# Patient Record
Sex: FEMALE | Race: WHITE | Employment: UNEMPLOYED | URBAN - METROPOLITAN AREA
[De-identification: names, ages, dates, MRNs, and addresses within clinical notes are randomized per-mention and may not be internally consistent; named-entity substitution may affect disease eponyms.]

---

## 2023-01-24 ENCOUNTER — TELEPHONE (OUTPATIENT)
Dept: UROLOGY | Facility: CLINIC | Age: 33
End: 2023-01-24
Payer: COMMERCIAL

## 2023-01-24 NOTE — TELEPHONE ENCOUNTER
[facility] Call Center    Phone Message    May a detailed message be left on voicemail: yes     Reason for Call: Other: Pt is looking for a Vaginoplasty consult with Dr. Espinosa, please call pt to further discuss, thanks!     Action Taken: Message routed to:  Other: Uro    Travel Screening: Not Applicable

## 2023-01-24 NOTE — CONFIDENTIAL NOTE
Writer JONH re: request for consult with the [facility] [place]. Mychart not active.

## 2023-01-25 ENCOUNTER — TELEPHONE (OUTPATIENT)
Dept: PLASTIC SURGERY | Facility: CLINIC | Age: 33
End: 2023-01-25
Payer: COMMERCIAL

## 2023-01-25 DIAGNOSIS — F64.0 GENDER DYSPHORIA IN ADULT: Primary | ICD-10-CM

## 2023-01-25 NOTE — CONFIDENTIAL NOTE
[facility] :  Care Coordination Note     SITUATION   Ria Leija (she/her) is a 32 year old adult who is receiving support for:  Care Team  .    BACKGROUND     Pt is scheduled for a full depth vaginoplasty consult with Dr. Espinosa on 12/12/23.     Ria was going through the process with a different surgeon, but it fell through. Pt just finished hair removal.     ASSESSMENT     Surgery              C[facility] Assessment  [facility] ([facility]) Enrollment: Enrolled  Patient has a therapist: Yes  Letter of support #1: Requested  Letter of support #2: Requested  Surgery being considered: Yes  Vaginoplasty: Yes    Pt reports:   No nicotine  HRT 7 years  No other gender affirming surgeries      PLAN          Nursing Interventions:       Follow-up plan:  1. Obtain ARSENIO Garcia

## 2023-03-24 ENCOUNTER — TELEPHONE (OUTPATIENT)
Dept: PLASTIC SURGERY | Facility: CLINIC | Age: 33
End: 2023-03-24
Payer: COMMERCIAL

## 2023-03-24 NOTE — CONFIDENTIAL NOTE
Writer called re: options for earlier vaginoplasty consult. Pt is done with hair removal so would be best to get bumped up on Dr. Espinosa's schedule. Could not leave voicemail since voicemail is full. Mychart not active.

## 2023-04-03 ENCOUNTER — TELEPHONE (OUTPATIENT)
Dept: PLASTIC SURGERY | Facility: CLINIC | Age: 33
End: 2023-04-03
Payer: COMMERCIAL

## 2023-04-03 NOTE — CONFIDENTIAL NOTE
Writer called to offer earlier vaginoplasty consult with Dr. Espinosa. Pt reported completing hair removal so would be best to schedule earlier with Dr. Espinosa, rather than Misti Bartholomew. Couldn't LVM because voicemail is full.

## 2023-06-26 ENCOUNTER — TELEPHONE (OUTPATIENT)
Dept: PLASTIC SURGERY | Facility: CLINIC | Age: 33
End: 2023-06-26
Payer: COMMERCIAL

## 2023-06-26 NOTE — CONFIDENTIAL NOTE
Writer reached out to offer earlier appointment available with Dr. Espinosa. Pt reports being done with hair removal. Writer rescheduled her for 9/12/23.

## 2023-07-06 NOTE — TELEPHONE ENCOUNTER
FUTURE VISIT INFORMATION      FUTURE VISIT INFORMATION:    Date: 9/12/23    Time: 2:00pm    Location: [facility] [place]  REFERRAL INFORMATION:    Reason for visit/diagnosis  full depth vaginoplasty    RECORDS REQUESTED FROM:       No recs to collect

## 2023-09-12 ENCOUNTER — TELEPHONE (OUTPATIENT)
Dept: PLASTIC SURGERY | Facility: CLINIC | Age: 33
End: 2023-09-12

## 2023-09-12 ENCOUNTER — OFFICE VISIT (OUTPATIENT)
Dept: PLASTIC SURGERY | Facility: CLINIC | Age: 33
End: 2023-09-12
Payer: COMMERCIAL

## 2023-09-12 ENCOUNTER — PRE VISIT (OUTPATIENT)
Dept: UROLOGY | Facility: CLINIC | Age: 33
End: 2023-09-12

## 2023-09-12 VITALS
OXYGEN SATURATION: 97 % | WEIGHT: 293 LBS | BODY MASS INDEX: 45.99 KG/M2 | HEART RATE: 98 BPM | DIASTOLIC BLOOD PRESSURE: 85 MMHG | HEIGHT: 67 IN | SYSTOLIC BLOOD PRESSURE: 121 MMHG

## 2023-09-12 DIAGNOSIS — F64.0 GENDER DYSPHORIA IN ADULT: Primary | ICD-10-CM

## 2023-09-12 PROBLEM — F41.1 GENERALIZED ANXIETY DISORDER: Status: ACTIVE | Noted: 2017-01-30

## 2023-09-12 PROBLEM — F32.1 MODERATE MAJOR DEPRESSION, SINGLE EPISODE (H): Status: ACTIVE | Noted: 2017-04-21

## 2023-09-12 PROCEDURE — 99205 OFFICE O/P NEW HI 60 MIN: CPT | Performed by: NURSE PRACTITIONER

## 2023-09-12 RX ORDER — MEDROXYPROGESTERONE ACETATE 10 MG
10 TABLET ORAL EVERY MORNING
COMMUNITY
Start: 2022-07-18

## 2023-09-12 RX ORDER — ESCITALOPRAM OXALATE 20 MG/1
1.5 TABLET ORAL EVERY MORNING
COMMUNITY
Start: 2022-07-18

## 2023-09-12 RX ORDER — HYDROXYZINE HYDROCHLORIDE 50 MG/1
100 TABLET, FILM COATED ORAL EVERY 6 HOURS PRN
COMMUNITY
Start: 2023-06-16

## 2023-09-12 RX ORDER — SPIRONOLACTONE 100 MG/1
250 TABLET, FILM COATED ORAL EVERY MORNING
Status: ON HOLD | COMMUNITY
Start: 2022-07-18 | End: 2024-09-30

## 2023-09-12 RX ORDER — ESTRADIOL 2 MG/1
2 TABLET ORAL 2 TIMES DAILY
COMMUNITY
Start: 2022-07-20

## 2023-09-12 ASSESSMENT — PAIN SCALES - GENERAL: PAINLEVEL: NO PAIN (0)

## 2023-09-12 NOTE — LETTER
9/12/2023       RE: Ria Leija  [address]  [address]  [place] [address]     Dear Colleague,    Thank you for referring your patient, Ria Leija, to the [facility] [place] [facility]. Please see a copy of my visit note below.        Name: Ria Leija     MRN: [id number]   YOB: 1990                 Chief Complaint:   Gender Dysphoria            History of Present Illness:   Ria is a 33 year old transgender female seen in consultation for gender dysphoria    Patient transitioned starting approximately 8 years ago  Preferred pronouns are: she/her/hers  The patient has been on exogenous hormones since: Feb 2016 (spironolactone, estradiol, progesterone).  In terms of an intimate relationship, the patient is . Here with her wife, Hansa.  In terms of fertility, the patient: had done sperm banking but her sample was destroyed and she is not planning to do this again. They are considering donor sperm or adoption.    (New)  The patient has obtained letters of support from two mental health providers. They have not yet been reviewed, but she brought them to clinic today    (Prior Surgery)  The patient has previously undergone no gender surgeries.     Long-term surgical goals for the patient include: full depth vaginoplasty    The patient is here today expressing interest in full depth vaginoplasty.    The patient has done hair removal. Finished hair removal over 6 months ago.     Patient had a consult with another gender program but was told there was no flexibility to their BMI cut-off. She was devastated and is hoping our program will work with her to find a healthy compromise. She is very active and reports having a high muscle mass. She states she does not feel like BMI of 35 is realistic for her and she would not be healthy if she tried. She reports increasing muscle mass when  "working on weight loss which keeps her BMI higher.  She reports a healthy weight for her is weight goal between 290-300lbs. She will work on weight loss and exercise while waiting to see Dr Espinosa.          Past Medical History:   No past medical history on file.  Anxiety  Depression  OCD  Dysplexia  Dysgraphia  Autonomic aphororesis  ADHD  Sensory processing d/o  Asthma - well controlled         Past Surgical History:   No past surgical history on file.  Eustachian tubes as a child         Social History:     Social History     Tobacco Use    Smoking status: Never    Smokeless tobacco: Never   Substance Use Topics    Alcohol use: Not on file            Family History:   No family history on file.           Allergies:     Allergies   Allergen Reactions    Amoxicillin Other (See Comments)     Other Reaction(s): Unknown    States that it has never cleared anything up.    Codeine Other (See Comments)     hallucinations            Medications:     Current Outpatient Medications   Medication Sig    amitriptyline (ELAVIL) 25 MG tablet Take 1 tablet by mouth every evening    escitalopram (LEXAPRO) 20 MG tablet Take 1.5 tablets by mouth daily    estradiol (ESTRACE) 2 MG tablet Take 2 tablets by mouth 2 times daily    hydrOXYzine (ATARAX) 50 MG tablet Take 100 mg by mouth    medroxyPROGESTERone (PROVERA) 10 MG tablet Take 10 mg by mouth daily    spironolactone (ALDACTONE) 100 MG tablet Take 250 mg by mouth daily     No current facility-administered medications for this visit.             Review of Systems:    ROS: ROS neg other than the symptoms noted above in the HPI.          Physical Exam:   /85 (BP Location: Left arm, Patient Position: Sitting, Cuff Size: Adult Large)   Pulse 98   Ht 1.702 m (5' 7\")   Wt (!) 150.2 kg (331 lb 3.2 oz)   SpO2 97%   BMI 51.87 kg/m    General: age-appropriate in NAD  HEENT: Head AT/NC, EOMI, CN Grossly intact  Resp: no respiratory distress  : Scrotum nearly hairless except for " small cluster of hair along right side of scrotum. Bilateral testicles present in scrotum. Penile shaft and mons nearly hairless except for small cluster at base of penile shaft  Neuro: grossly intact  Motor: excellent strength throughout  Skin: clear of rashes or ecchymoses.         Outside records:    I spent 10 minutes reviewing outside records.         Assessment and Plan:   33 year old transgender female with gender dysphoria    The criteria for genital surgery are specific to the type of surgery being requested.  Criteria for bottom surgery:    1. Persistent, well documented gender dysphoria;  2. Capacity to make a fully informed decision and to consent for treatment;  3. Age of consent (>18 years old)  4. If significant medical or mental health concerns are present, they must be well controlled.  5. 12 continuous months of hormone therapy as appropriate to the patient s gender goals (unless  the patient has a medical contraindication or is otherwise unable or unwilling to take  Hormones).  6. Two letters of support    The aim of hormone therapy prior to gonadectomy is primarily to introduce a period of reversible  estrogen or testosterone suppression, before the patient undergoes irreversible surgical intervention.    I reviewed the steps of orchiectomy. I reviewed the surgical procedure. I reviewed the risks and benefits including bleeding, infection and irreversible nature of the procedure. The patient would like orchi as part of vaginoplasty.    Hair removal is a requirement prior to full depth vaginoplasty as the genital skin will be placed in the vaginal cavity. Lack of hair removal would lead to complications related to intravaginal hair. This is nearly impossible to remove postoperatively.    She has a persistent, well documented gender dysphoria. She has capacity to make a fully informed decision and to consent for treatment. Her mental health issues are well controlled. She has been on continuous  hormones for years. She has letters of support.     The patient meets all of these criteria. We discussed that gender affirmation surgery should be considered permanent. We discussed risks/complications of rectal injury, rectovaginal fistula, bleeding, fluid collection, infection, injury to surrounding structures, flap loss, sensory loss, wound dehiscence, vaginal prolapse, vaginal shrinkage/stenosis, need for lifelong dilation, urinary stream abnormalities, DVT/PE and need for revision surgery.     We discussed the option for minimal depth and full depth. She would like full depth vaginoplasty     We also discussed the need to stop hormones matt-procedurally for 1 week before and after surgery.     We discussed that transgender vaginoplasty for this patient would include: penectomy, orchiectomy, clitoroplasty, labiaplasty, urethral reconstruction, creation of a vagina, skin graft, colpopexy to suspend the vagina, and scrotectomy.       Needs touch up hair removal on areas discussed during consult (base of penis on mons and side of scrotum. Any other random hairs remaining on penis and scrotum. She will work on this while waiting to see Dr Espinosa  Needs to see Dr Espinosa for exam before prior auth      Plan: SW to review letters of support. Patient to work on weight loss in anticipation for exam and appointment with Dr Espinosa. Patient to complete remaining hair removal to touch up regrowth.    F/U: [facility] [place] team to contact patient to schedule appointment with Dr Espinosa once LOS reviewed      60 minutes spent on day of encounter doing chart review, history and exam, consultation and education, and additional activities as including in note above.          Again, thank you for allowing me to participate in the care of your patient.      Sincerely,    DAVID Royal CNP

## 2023-09-12 NOTE — PROGRESS NOTES
Name: Ria Leija     MRN: [id number]   YOB: 1990                 Chief Complaint:   Gender Dysphoria            History of Present Illness:   Ria is a 33 year old transgender female seen in consultation for gender dysphoria    Patient transitioned starting approximately 8 years ago  Preferred pronouns are: she/her/hers  The patient has been on exogenous hormones since: Feb 2016 (spironolactone, estradiol, progesterone).  In terms of an intimate relationship, the patient is . Here with her wife, Hansa.  In terms of fertility, the patient: had done sperm banking but her sample was destroyed and she is not planning to do this again. They are considering donor sperm or adoption.    (New)  The patient has obtained letters of support from two mental health providers. They have not yet been reviewed, but she brought them to clinic today    (Prior Surgery)  The patient has previously undergone no gender surgeries.     Long-term surgical goals for the patient include: full depth vaginoplasty    The patient is here today expressing interest in full depth vaginoplasty.    The patient has done hair removal. Finished hair removal over 6 months ago.     Patient had a consult with another gender program but was told there was no flexibility to their BMI cut-off. She was devastated and is hoping our program will work with her to find a healthy compromise. She is very active and reports having a high muscle mass. She states she does not feel like BMI of 35 is realistic for her and she would not be healthy if she tried. She reports increasing muscle mass when working on weight loss which keeps her BMI higher.  She reports a healthy weight for her is weight goal between 290-300lbs. She will work on weight loss and exercise while waiting to see Dr Espinosa.          Past Medical History:   No past medical history on file.  Anxiety  Depression  OCD  Dysplexia  Dysgraphia  Autonomic  "aphororesis  ADHD  Sensory processing d/o  Asthma - well controlled         Past Surgical History:   No past surgical history on file.  Eustachian tubes as a child         Social History:     Social History     Tobacco Use    Smoking status: Never    Smokeless tobacco: Never   Substance Use Topics    Alcohol use: Not on file            Family History:   No family history on file.           Allergies:     Allergies   Allergen Reactions    Amoxicillin Other (See Comments)     Other Reaction(s): Unknown    States that it has never cleared anything up.    Codeine Other (See Comments)     hallucinations            Medications:     Current Outpatient Medications   Medication Sig    amitriptyline (ELAVIL) 25 MG tablet Take 1 tablet by mouth every evening    escitalopram (LEXAPRO) 20 MG tablet Take 1.5 tablets by mouth daily    estradiol (ESTRACE) 2 MG tablet Take 2 tablets by mouth 2 times daily    hydrOXYzine (ATARAX) 50 MG tablet Take 100 mg by mouth    medroxyPROGESTERone (PROVERA) 10 MG tablet Take 10 mg by mouth daily    spironolactone (ALDACTONE) 100 MG tablet Take 250 mg by mouth daily     No current facility-administered medications for this visit.             Review of Systems:    ROS: ROS neg other than the symptoms noted above in the HPI.          Physical Exam:   /85 (BP Location: Left arm, Patient Position: Sitting, Cuff Size: Adult Large)   Pulse 98   Ht 1.702 m (5' 7\")   Wt (!) 150.2 kg (331 lb 3.2 oz)   SpO2 97%   BMI 51.87 kg/m    General: age-appropriate in NAD  HEENT: Head AT/NC, EOMI, CN Grossly intact  Resp: no respiratory distress  : Scrotum nearly hairless except for small cluster of hair along right side of scrotum. Bilateral testicles present in scrotum. Penile shaft and mons nearly hairless except for small cluster at base of penile shaft  Neuro: grossly intact  Motor: excellent strength throughout  Skin: clear of rashes or ecchymoses.         Outside records:    I spent 10 minutes " reviewing outside records.         Assessment and Plan:   33 year old transgender female with gender dysphoria    The criteria for genital surgery are specific to the type of surgery being requested.  Criteria for bottom surgery:    1. Persistent, well documented gender dysphoria;  2. Capacity to make a fully informed decision and to consent for treatment;  3. Age of consent (>18 years old)  4. If significant medical or mental health concerns are present, they must be well controlled.  5. 12 continuous months of hormone therapy as appropriate to the patient s gender goals (unless  the patient has a medical contraindication or is otherwise unable or unwilling to take  Hormones).  6. Two letters of support    The aim of hormone therapy prior to gonadectomy is primarily to introduce a period of reversible  estrogen or testosterone suppression, before the patient undergoes irreversible surgical intervention.    I reviewed the steps of orchiectomy. I reviewed the surgical procedure. I reviewed the risks and benefits including bleeding, infection and irreversible nature of the procedure. The patient would like orchi as part of vaginoplasty.    Hair removal is a requirement prior to full depth vaginoplasty as the genital skin will be placed in the vaginal cavity. Lack of hair removal would lead to complications related to intravaginal hair. This is nearly impossible to remove postoperatively.    She has a persistent, well documented gender dysphoria. She has capacity to make a fully informed decision and to consent for treatment. Her mental health issues are well controlled. She has been on continuous hormones for years. She has letters of support.     The patient meets all of these criteria. We discussed that gender affirmation surgery should be considered permanent. We discussed risks/complications of rectal injury, rectovaginal fistula, bleeding, fluid collection, infection, injury to surrounding structures, flap loss,  sensory loss, wound dehiscence, vaginal prolapse, vaginal shrinkage/stenosis, need for lifelong dilation, urinary stream abnormalities, DVT/PE and need for revision surgery.     We discussed the option for minimal depth and full depth. She would like full depth vaginoplasty     We also discussed the need to stop hormones matt-procedurally for 1 week before and after surgery.     We discussed that transgender vaginoplasty for this patient would include: penectomy, orchiectomy, clitoroplasty, labiaplasty, urethral reconstruction, creation of a vagina, skin graft, colpopexy to suspend the vagina, and scrotectomy.       Needs touch up hair removal on areas discussed during consult (base of penis on mons and side of scrotum. Any other random hairs remaining on penis and scrotum. She will work on this while waiting to see Dr Espinosa  Needs to see Dr Espinosa for exam before prior auth      Plan: SW to review letters of support. Patient to work on weight loss in anticipation for exam and appointment with Dr Espinosa. Patient to complete remaining hair removal to touch up regrowth.    F/U: [facility] [place] team to contact patient to schedule appointment with Dr Espinosa once LOS reviewed    DAVID Ryder, CNP  [facility]    60 minutes spent on day of encounter doing chart review, history and exam, consultation and education, and additional activities as including in note above.

## 2023-09-12 NOTE — NURSING NOTE
"Chief Complaint   Patient presents with    Consult     Ria is being seen today for a consult regarding FD vaginoplasty.       Vitals:    09/12/23 1412 09/12/23 1418   BP: (!) 173/77 121/85   BP Location: Left arm Left arm   Patient Position: Sitting Sitting   Cuff Size: Adult Large Adult Large   Pulse: 98    SpO2: 97%    Weight: (!) 150.2 kg (331 lb 3.2 oz)    Height: 1.702 m (5' 7\")        Body mass index is 51.87 kg/m .    Mehul Ferguson, EMT    "

## 2023-09-12 NOTE — TELEPHONE ENCOUNTER
Dr Espinosa is out unexpectedly, call pt to ask if she wants to see Misti Bartholomew NP for her consult or reschedule with Dr Espinosa. Unable to reach, left voicemail stating reason for calling and provided a callback number. Pt called back, stated she would like to reschedule with Misti in clinic today. Rescheduled pt accordingly.

## 2023-12-12 ENCOUNTER — OFFICE VISIT (OUTPATIENT)
Dept: PLASTIC SURGERY | Facility: CLINIC | Age: 33
End: 2023-12-12
Payer: COMMERCIAL

## 2023-12-12 VITALS
BODY MASS INDEX: 45.99 KG/M2 | DIASTOLIC BLOOD PRESSURE: 77 MMHG | HEIGHT: 67 IN | OXYGEN SATURATION: 96 % | WEIGHT: 293 LBS | SYSTOLIC BLOOD PRESSURE: 132 MMHG | HEART RATE: 89 BPM

## 2023-12-12 DIAGNOSIS — F64.0 GENDER DYSPHORIA IN ADULT: Primary | ICD-10-CM

## 2023-12-12 PROCEDURE — 99215 OFFICE O/P EST HI 40 MIN: CPT | Mod: KX | Performed by: UROLOGY

## 2023-12-12 ASSESSMENT — PAIN SCALES - GENERAL: PAINLEVEL: NO PAIN (0)

## 2023-12-12 NOTE — PROGRESS NOTES
"SUBJECTIVE:  Ria is a 33 year old here for a return consult and hair check with Dr Espinosa. She had an initial consult with Misti Bartholomew NP for full depth vaginoplasty. Hair check done at that time showed a few patches of remaining pubic hair which she planned to work on.  She has completed several more sessions of laser hair removal and those areas are now clear. She is here today with her wife, Hansa.   She states she had lost some weight but gained some of it back while she had covid over Thanksgiving. She still wants to proceed with FD vaginoplasty. She is flexible on whether peritoneal tissue is used or not.    OBJECTIVE:  /77 (BP Location: Left arm, Patient Position: Sitting, Cuff Size: Adult Large)   Pulse 89   Ht 1.702 m (5' 7\")   Wt 149.1 kg (328 lb 12.8 oz)   SpO2 96%   BMI 51.50 kg/m     General: NAD  : normal penis, testicles and scrotum with some skin laxity.  MSK: Good reach with upper extremities.     ASSESSMENT/PLAN:  33 year old with gender dysphoria seeking full depth vaginoplasty  She does have morbid obesity. However, she carries her weight mostly in upper arms/thighs  LOS in chart awaiting review by .  Hair removal is complete.  She will be ready for PA once letters are reviewed.  We reviewed the steps of full depth vaginoplasty and possible risks, including but not limited to potential need for peritoneal tissue, injury to surrounding organs, difficulty ventilating lungs during robotic phase of surgery, and risk of DVT/VTE.    She understands the unique challenges of full depth vaginoplasty in a patient of her size/weight.    We discussed what her preferences would be if completion of FD vaginoplasty became impossible due to lung ventilation (and minimal depth was essentially already performed).    She would choose for completion of minimal depth \"if absolutely necessary\" but would want to try for full depth.    Misti Bartholomew, DAVID, CNP    Physician Attestation "   I, Joe Espinosa MD, saw this patient and agree with the findings and plan of care as documented in the note.      I had a long nikhil discussion about full depth vaginoplasty with BMI 51.  She was denied by other [facility].  However, I discussed I do think her surgery is feasible due to how she carries her weight.  She also demonstrated that she could reach to perform dilation.  Review letters then submit PA for full depth vaginoplasty    Joe Espinosa MD  Reconstructive Urology    45 minutes spent by me on the date of the encounter doing chart review, history and exam, documentation and further activities per the note

## 2023-12-12 NOTE — NURSING NOTE
"Chief Complaint   Patient presents with    RECHECK     Hair check.       Vitals:    12/12/23 1446   BP: 132/77   BP Location: Left arm   Patient Position: Sitting   Cuff Size: Adult Large   Pulse: 89   SpO2: 96%   Weight: 328 lb 12.8 oz   Height: 5' 7\"       Body mass index is 51.5 kg/m .    Mehul Ferguson, EMT    "

## 2023-12-12 NOTE — LETTER
"12/12/2023       RE: Ria Leija  [address]  [address]  [place] [address]       Dear Colleague,    Thank you for referring your patient, Ria Leija, to the [facility] [place] at [facility]. Please see a copy of my visit note below.    SUBJECTIVE:  Ria is a 33 year old here for a return consult and hair check with Dr Espinosa. She had an initial consult with Misti Bartholomew NP for full depth vaginoplasty. Hair check done at that time showed a few patches of remaining pubic hair which she planned to work on.  She has completed several more sessions of laser hair removal and those areas are now clear. She is here today with her wife, Hansa.   She states she had lost some weight but gained some of it back while she had covid over Thanksgiving. She still wants to proceed with FD vaginoplasty. She is flexible on whether peritoneal tissue is used or not.    OBJECTIVE:  /77 (BP Location: Left arm, Patient Position: Sitting, Cuff Size: Adult Large)   Pulse 89   Ht 1.702 m (5' 7\")   Wt 149.1 kg (328 lb 12.8 oz)   SpO2 96%   BMI 51.50 kg/m     General: NAD  : normal penis, testicles and scrotum with some skin laxity.  MSK: Good reach with upper extremities.     ASSESSMENT/PLAN:  33 year old with gender dysphoria seeking full depth vaginoplasty  She does have morbid obesity. However, she carries her weight mostly in upper arms/thighs  LOS in chart awaiting review by .  Hair removal is complete.  She will be ready for PA once letters are reviewed.  We reviewed the steps of full depth vaginoplasty and possible risks, including but not limited to potential need for peritoneal tissue, injury to surrounding organs, difficulty ventilating lungs during robotic phase of surgery, and risk of DVT/VTE.    She understands the unique challenges of full depth vaginoplasty in a patient of her " "size/weight.    We discussed what her preferences would be if completion of FD vaginoplasty became impossible due to lung ventilation (and minimal depth was essentially already performed).    She would choose for completion of minimal depth \"if absolutely necessary\" but would want to try for full depth.    Misti Bartholomew, DAVID, CNP    Physician Attestation   I, Joe Espinosa MD, saw this patient and agree with the findings and plan of care as documented in the note.      I had a long nikhil discussion about full depth vaginoplasty with BMI 51.  She was denied by other centers of [facility].  However, I discussed I do think her surgery is feasible due to how she carries her weight.  She also demonstrated that she could reach to perform dilation.  Review letters then submit PA for full depth vaginoplasty      45 minutes spent by me on the date of the encounter doing chart review, history and exam, documentation and further activities per the note          Again, thank you for allowing me to participate in the care of your patient.      Sincerely,    Joe Espinosa MD      "

## 2023-12-18 ENCOUNTER — DOCUMENTATION ONLY (OUTPATIENT)
Dept: PLASTIC SURGERY | Facility: CLINIC | Age: 33
End: 2023-12-18
Payer: COMMERCIAL

## 2023-12-18 NOTE — PROGRESS NOTES
[facility] :  Care Coordination Note     SITUATION   Ria Leija (she/her) is a 33 year old adult who is receiving support for:  Care Team  .    BACKGROUND     Two LOS received. LOS date 7/18/23 meets criteria (located in media dated 9/12), however, provider does state they are no longer working with patient. Los dated 1/3/23 (located in media tab dated 9/12) does not meet criteria. Sent message to pt requesting updated LOS.    2/16/24  Updated LOS received (located in media tab dated 2/8) and meets criteria. Both LOS are in pts chart. Sent message to [facility] to request PA for vaginoplasty.    ASSESSMENT     Surgery              CGC Assessment  [facility] ([facility]) Enrollment: Enrolled  Patient has a therapist: Yes  Letter of support #1: Received  Letter #1 Date: 01/03/23  Letter of support #2: Received  Letter #2 Date: 06/18/23  Surgery being considered: Yes  Vaginoplasty: Yes      PLAN          Nursing Interventions:       Follow-up plan:  Surgeon will submit PA for vaginoplasty.        TISH Moreira

## 2024-02-23 DIAGNOSIS — F64.9 GENDER DYSPHORIA: Primary | ICD-10-CM

## 2024-02-23 RX ORDER — CEFAZOLIN SODIUM IN 0.9 % NACL 3 G/100 ML
3 INTRAVENOUS SOLUTION, PIGGYBACK (ML) INTRAVENOUS
Status: CANCELLED | OUTPATIENT
Start: 2024-02-23

## 2024-02-23 RX ORDER — HEPARIN SODIUM 5000 [USP'U]/.5ML
5000 INJECTION, SOLUTION INTRAVENOUS; SUBCUTANEOUS
Status: CANCELLED | OUTPATIENT
Start: 2024-02-23

## 2024-02-23 RX ORDER — CEFAZOLIN SODIUM IN 0.9 % NACL 3 G/100 ML
3 INTRAVENOUS SOLUTION, PIGGYBACK (ML) INTRAVENOUS SEE ADMIN INSTRUCTIONS
Status: CANCELLED | OUTPATIENT
Start: 2024-02-23

## 2024-02-23 RX ORDER — METRONIDAZOLE 500 MG/100ML
500 INJECTION, SOLUTION INTRAVENOUS
Status: CANCELLED | OUTPATIENT
Start: 2024-02-23

## 2024-05-03 ENCOUNTER — TELEPHONE (OUTPATIENT)
Dept: UROLOGY | Facility: CLINIC | Age: 34
End: 2024-05-03
Payer: COMMERCIAL

## 2024-05-03 NOTE — TELEPHONE ENCOUNTER
Spoke with patient, confirmed all scheduled information.       Patient is schedule for surgery with: Dr. Espinosa    Surgery Date: 9/30     Location: [facility]    H&P: to be completed by PAC clinic - scheduled on 9/16     Post-op:  10/15, in-person visit, in plastic clinic with Dr. Espinosa    Patient will receive surgery arrival and start time from PAC. Patient is aware that if times change after they see PAC, they will receive a call from the pre-admission nurses 1-2 days prior to surgery with updated arrival time and NPO instructions.    Patient aware times are subject to change up until day before surgery.     Patient questions/concerns: N/A     Surgery packet was sent via US mail on 5/3 with jericho Dove on 5/3/2024 at 9:10 AM

## 2024-06-20 NOTE — TELEPHONE ENCOUNTER
FUTURE VISIT INFORMATION      SURGERY INFORMATION:  Date: 9/30/24  [facility]  Surgeon:  Joe Espinosa MD   Anesthesia Type:  general  Procedure: Robotic Full Depth Vaginoplasty. Possible Minimal Depth       RECORDS REQUESTED FROM:       Primary Care Provider: [facility]

## 2024-08-28 ENCOUNTER — TELEPHONE (OUTPATIENT)
Dept: PLASTIC SURGERY | Facility: CLINIC | Age: 34
End: 2024-08-28
Payer: COMMERCIAL

## 2024-08-28 NOTE — TELEPHONE ENCOUNTER
Pre and Post Op Patient Education                                       Diagnosis: gender dysphoria  Teaching pre and post op for: Full depth vaginoplasty (possible minimal depth)  Person involved in teaching: patient    Patient demonstrates an understanding of the following:  - Date of surgery:  9/30/24 - Monday  - Surgery time: 7:30 am (pt understands that this time could change)  - Location of surgery: [facility] - [address] [place] [id number]     - Pre-operative history physical: PAC 9/16/24    - Post-op follow-up:   10/15/24 at 2:20 pm with Dr Espinosa  11/11/24 at 12:30 pm with Misti Bartholomew NP        Patient verbalizes an understanding of the following:  - The need for a responsible adult  and someone to stay with them for the first 24 hours post-operatively: Pt to be in hospital x5 days after surgery  - Pre-op bowel prep: Yes  - NPO per anesthesia guidelines: Yes  - Pre-op showering x2 with Hibiclens/chlorhexidine soap: Yes  - The need to stop/discontinue all hormones 1 weeks before surgery and may restart upon return home after surgery: Yes, pt to stop PO estradiol and PO progesterone on 9/23/24. Advised pt that spironolactone does not need to be restarted after surgery and that she should schedule an appointment with hormone prescriber 1-3 months after surgery for hormone level check. Pt recently moved to the [facility] and needs a new hormone prescriber. Sent pt a [employer] message with our hormone prescriber referral list.      Discussed   - Pain management after surgery  - Infection prevention and hand hygiene  - Surgical procedure site care taught  - Signs and symptoms of infection  - Wound care and will be taught at the time of discharge  - Importance of dilation, technique, and schedule   - Reviewed Your Guide to Full Depth Vaginoplasty packet in full  - Information about how to contact the hospital, nurse, and clinic if needed    Postoperative  Plans:  Pt has a robust support system. Her wife will be supporting her after surgery along with a large group of people who will be helping with things pt cannot do during recovery. Some of these friends will be staying with the patient to help as well. Pt is not working at this time. 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 Pt feels comfortable with her plan after surgery and all questions were answered.     Surgical instructions given to patient via phone.    Total time with patient: 45 minutes    Morgan Sullivan RN

## 2024-09-15 LAB
ABO/RH(D): NORMAL
ANTIBODY SCREEN: NEGATIVE
SPECIMEN EXPIRATION DATE: NORMAL

## 2024-09-16 ENCOUNTER — APPOINTMENT (OUTPATIENT)
Dept: LAB | Facility: CLINIC | Age: 34
End: 2024-09-16
Payer: COMMERCIAL

## 2024-09-16 ENCOUNTER — ANESTHESIA EVENT (OUTPATIENT)
Dept: SURGERY | Facility: CLINIC | Age: 34
End: 2024-09-16
Payer: COMMERCIAL

## 2024-09-16 ENCOUNTER — PRE VISIT (OUTPATIENT)
Dept: SURGERY | Facility: CLINIC | Age: 34
End: 2024-09-16

## 2024-09-16 ENCOUNTER — LAB (OUTPATIENT)
Dept: LAB | Facility: CLINIC | Age: 34
End: 2024-09-16
Payer: COMMERCIAL

## 2024-09-16 ENCOUNTER — OFFICE VISIT (OUTPATIENT)
Dept: SURGERY | Facility: CLINIC | Age: 34
End: 2024-09-16
Payer: COMMERCIAL

## 2024-09-16 VITALS
TEMPERATURE: 98 F | HEART RATE: 97 BPM | WEIGHT: 293 LBS | HEIGHT: 67 IN | OXYGEN SATURATION: 97 % | BODY MASS INDEX: 45.99 KG/M2 | SYSTOLIC BLOOD PRESSURE: 116 MMHG | RESPIRATION RATE: 16 BRPM | DIASTOLIC BLOOD PRESSURE: 80 MMHG

## 2024-09-16 DIAGNOSIS — F64.0 GENDER DYSPHORIA IN ADULT: ICD-10-CM

## 2024-09-16 DIAGNOSIS — Z01.818 PREOP EXAMINATION: ICD-10-CM

## 2024-09-16 DIAGNOSIS — Z01.818 PREOP EXAMINATION: Primary | ICD-10-CM

## 2024-09-16 LAB
ANION GAP SERPL CALCULATED.3IONS-SCNC: 12 MMOL/L (ref 7–15)
BUN SERPL-MCNC: 9.8 MG/DL (ref 6–20)
CALCIUM SERPL-MCNC: 9.3 MG/DL (ref 8.8–10.4)
CHLORIDE SERPL-SCNC: 103 MMOL/L (ref 98–107)
CREAT SERPL-MCNC: 0.84 MG/DL (ref 0.51–1.17)
EGFRCR SERPLBLD CKD-EPI 2021: >90 ML/MIN/1.73M2
ERYTHROCYTE [DISTWIDTH] IN BLOOD BY AUTOMATED COUNT: 12.7 % (ref 10–15)
GLUCOSE SERPL-MCNC: 74 MG/DL (ref 70–99)
HCO3 SERPL-SCNC: 24 MMOL/L (ref 22–29)
HCT VFR BLD AUTO: 41 % (ref 35–53)
HGB BLD-MCNC: 14.1 G/DL (ref 13.3–17.7)
MCH RBC QN AUTO: 30.7 PG (ref 26.5–33)
MCHC RBC AUTO-ENTMCNC: 34.4 G/DL (ref 31.5–36.5)
MCV RBC AUTO: 89 FL (ref 78–100)
PLATELET # BLD AUTO: 286 10E3/UL (ref 150–450)
POTASSIUM SERPL-SCNC: 3.8 MMOL/L (ref 3.4–5.3)
RBC # BLD AUTO: 4.59 10E6/UL (ref 3.8–5.9)
SODIUM SERPL-SCNC: 139 MMOL/L (ref 135–145)
WBC # BLD AUTO: 11.3 10E3/UL (ref 4–11)

## 2024-09-16 PROCEDURE — 80048 BASIC METABOLIC PNL TOTAL CA: CPT | Performed by: PATHOLOGY

## 2024-09-16 PROCEDURE — 86900 BLOOD TYPING SEROLOGIC ABO: CPT

## 2024-09-16 PROCEDURE — 85027 COMPLETE CBC AUTOMATED: CPT | Performed by: PATHOLOGY

## 2024-09-16 PROCEDURE — 36415 COLL VENOUS BLD VENIPUNCTURE: CPT | Performed by: PATHOLOGY

## 2024-09-16 PROCEDURE — 99203 OFFICE O/P NEW LOW 30 MIN: CPT | Performed by: PHYSICIAN ASSISTANT

## 2024-09-16 ASSESSMENT — LIFESTYLE VARIABLES: TOBACCO_USE: 0

## 2024-09-16 ASSESSMENT — ENCOUNTER SYMPTOMS: SEIZURES: 0

## 2024-09-16 ASSESSMENT — PAIN SCALES - GENERAL: PAINLEVEL: NO PAIN (0)

## 2024-09-16 NOTE — H&P
Pre-Operative H & P     CC:  Preoperative exam to assess for increased cardiopulmonary risk while undergoing surgery and anesthesia.    Date of Encounter: 9/16/2024  Primary Care Physician:  No Ref-Primary, Physician     Reason for visit:   Encounter Diagnoses   Name Primary?    Gender dysphoria in adult Yes    Preop examination        HPI  Ria Leija is a 34 year old adult who presents for pre-operative H & P in preparation for  Procedure Information       Case: [id number] Date/Time: 09/30/24 0730    Procedure: Robotic Full Depth Vaginoplasty. Possible Minimal Depth (Abdomen)    Anesthesia type: General    Diagnosis: Gender dysphoria [F64.9]    Pre-op diagnosis: Gender dysphoria [F64.9]    Location:  OR 03 / [facility]    Providers: Joe Espinosa MD            Patient is being evaluated for comorbid conditions of migraines, anxiety, ADHD, obesity.    Ms. Leija has a history of gender dysphoria. She had an initial consult with Misti Bartholomew NP for full depth vaginoplasty. She has also been counseled by Dr. Espinosa and now presents for the above procedure.      History is obtained from the patient and chart review    Hx of abnormal bleeding or anti-platelet use: denies    Menstrual history: No LMP recorded.:       Past Medical History  Past Medical History:   Diagnosis Date    ADHD (attention deficit hyperactivity disorder)     Anxiety     Depression     Gender dysphoria     Migraine     Morbid obesity (H)        Past Surgical History  Past Surgical History:   Procedure Laterality Date    MYRINGOTOMY, INSERT TUBE BILATERAL, COMBINED      in childhood       Prior to Admission Medications  Current Outpatient Medications   Medication Sig Dispense Refill    amitriptyline (ELAVIL) 25 MG tablet Take 1 tablet by mouth every evening      escitalopram (LEXAPRO) 20 MG tablet Take 1.5 tablets by mouth every morning.      estradiol (ESTRACE) 2 MG tablet Take 2 tablets by mouth 2 times daily      hydrOXYzine (ATARAX) 50 MG  tablet Take 100 mg by mouth every 6 hours as needed.      medroxyPROGESTERone (PROVERA) 10 MG tablet Take 10 mg by mouth every morning.      spironolactone (ALDACTONE) 100 MG tablet Take 250 mg by mouth every morning.         Allergies  Allergies   Allergen Reactions    Amoxicillin Other (See Comments)     Other Reaction(s): Unknown    States that it has never cleared anything up.    Codeine Other (See Comments)     hallucinations       Social History  Social History     Socioeconomic History    Marital status:      Spouse name: Not on file    Number of children: Not on file    Years of education: Not on file    Highest education level: Not on file   Occupational History    Not on file   Tobacco Use    Smoking status: Never    Smokeless tobacco: Never   Substance and Sexual Activity    Alcohol use: Yes     Comment: Occasional    Drug use: Yes     Types: Marijuana     Comment: Occasional edibles    Sexual activity: Not on file   Other Topics Concern    Not on file   Social History Narrative    Not on file     Social Determinants of Health     Financial Resource Strain: Patient Declined (11/28/2023)    Received from [facility], [facility]    Overall Financial Resource Strain (CARDIA)     Difficulty of Paying Living Expenses: Patient declined   Food Insecurity: Patient Declined (11/28/2023)    Received from [facility], [facility]    Hunger Vital Sign     Worried About Running Out of Food in the Last Year: Patient declined     Ran Out of Food in the Last Year: Patient declined   Transportation Needs: No Transportation Needs (11/28/2023)    Received from [facility], [facility]    PRAPARE - Transportation     Lack of Transportation (Medical): No     Lack  of Transportation (Non-Medical): No   Physical Activity: Sufficiently Active (11/28/2023)    Received from [facility], [facility]    Exercise Vital Sign     Days of Exercise per Week: 3 days     Minutes of Exercise per Session: 50 min   Stress: Stress Concern Present (11/28/2023)    Received from [facility], [facility]    [employer] - Occupational Stress Questionnaire     Feeling of Stress : Rather much   Social Connections: Unknown (11/28/2023)    Received from [facility], [facility]    Social Connection and Isolation Panel [NHANES]     Frequency of Communication with Friends and Family: More than three times a week     Frequency of Social Gatherings with Friends and Family: Once a week     Attends Latter-day Services: Patient declined     Active Member of Clubs or Organizations: Yes     Attends Club or Organization Meetings: More than 4 times per year     Marital Status:    Interpersonal Safety: Not on file   Housing Stability: Low Risk  (11/28/2023)    Received from [facility]    Housing Stability Vital Sign     Unable to Pay for Housing in the Last Year: No     In the last 12 months, how many places have you lived?: 1     In the last 12 months, was there a time when you did not have a steady place to sleep or slept in a shelter (including now)?: No       Family History  Family History   Problem Relation Age of Onset    Anesthesia Reaction No family hx of     Deep Vein Thrombosis (DVT) No family hx of        Review of Systems  The complete review of systems is negative other than noted in the HPI or here.     Anesthesia Evaluation   Pt has had prior anesthetic.     No history  "of anesthetic complications       ROS/MED HX  ENT/Pulmonary:     (+)     DIANNE risk factors,   obese,   allergic rhinitis,           asthma (denies inhaler use; well controlled)               (-) tobacco use and recent URI   Neurologic:  - neg neurologic ROS  (-) no seizures and no CVA   Cardiovascular:       METS/Exercise Tolerance: >4 METS    Hematologic:  - neg hematologic  ROS  (-) history of blood clots and history of blood transfusion   Musculoskeletal:  - neg musculoskeletal ROS     GI/Hepatic:  - neg GI/hepatic ROS  (-) GERD and liver disease   Renal/Genitourinary:  - neg Renal ROS  (-) renal disease   Endo:     (+)               Obesity,    (-) Type II DM   Psychiatric/Substance Use: Comment: Gender dysphoria    (+) psychiatric history anxiety and depression       Infectious Disease:  - neg infectious disease ROS     Malignancy:  - neg malignancy ROS     Other:  - neg other ROS          /80 (BP Location: Right arm, Patient Position: Sitting, Cuff Size: Adult Large)   Pulse 97   Temp 98  F (36.7  C) (Oral)   Resp 16   Ht 1.702 m (5' 7\")   Wt 145.5 kg (320 lb 12.8 oz)   SpO2 97%   BMI 50.24 kg/m      Physical Exam  Constitutional: Awake, alert, cooperative, no apparent distress, and appears stated age.  Eyes: Pupils equal, round and reactive to light, extra ocular muscles intact, sclera clear, conjunctiva normal.  HENT: Normocephalic, oral pharynx with moist mucus membranes, poor dentition, missing teeth, caries. No goiter appreciated. No removable dental hardware.  Respiratory: Clear to auscultation bilaterally, no crackles or wheezing. No SOB when supine.  Cardiovascular: Regular rate and rhythm, normal S1 and S2, and no murmur noted.  Carotids +2, no bruits. No edema. Palpable pulses to radial, DP and PT arteries.   GI: Normal bowel sounds, soft, obese, non-tender, no masses palpated. Exam limited due to body habitus.    Lymph/Hematologic: No cervical lymphadenopathy and no supraclavicular " lymphadenopathy.  Genitourinary:  deferred  Skin: Warm and dry.  No rashes.   Musculoskeletal: full ROM of neck. There is no redness, warmth, or swelling of the joints. Gross motor strength is normal.    Neurologic: Awake, alert, oriented to name, place and time. Cranial nerves II-XII are grossly intact. Gait is normal. Ambulates from chair to exam table, seats self, lies supine and sits back up w/o assistance.  Neuropsychiatric: Calm, cooperative. Normal affect. Pleasant. Answers questions appropriately, follows commands w/o difficulty.        PRIOR LABS/DIAGNOSTIC STUDIES:    All labs and imaging personally reviewed        The patient's records and results personally reviewed by this provider.       LAB/DIAGNOSTIC STUDIES TODAY:  BMP, CBC, T&S    Assessment    Ria Leija is a 34 year old adult seen as a PAC referral for risk assessment and optimization for anesthesia.    Plan/Recommendations  Pt will be optimized for the proposed procedure.  See below for details on the assessment, risk, and preoperative recommendations    NEUROLOGY  - No history of TIA, CVA or seizure    -Post Op delirium risk factors:  No risk identified    ENT  - No current airway concerns.  Will need to be reassessed day of surgery.  Mallampati: III  TM: > 3  - Poor dentition, caries, missing teeth    CARDIAC  - No history of CAD, Hypertension, and Afib  - METS (Metabolic Equivalents)  Patient performs 4 or more METS exercise without symptoms             Total Score: 0      RCRI-Very low risk: Class 1 0.4% complication rate             Total Score: 0        PULMONARY  DIANNE Medium Risk             Total Score: 3    DIANNE: BMI over 35 kg/m2    DIANNE: Neck Circum >16 in    DIANNE: Male      - Hx asthma, well controlled, denies inhaler use.  - Tobacco History    History   Smoking Status    Never   Smokeless Tobacco    Never       GI  - denies GERD  PONV High Risk  Total Score: 3           1 AN PONV: Pt is Female    1 AN PONV: Patient is not a current  "smoker    1 AN PONV: Intended Post Op Opioids          ENDOCRINE    - BMI: Estimated body mass index is 50.24 kg/m  as calculated from the following:    Height as of this encounter: 1.702 m (5' 7\").    Weight as of this encounter: 145.5 kg (320 lb 12.8 oz).  Class 3 Obesity (BMI > 40)  - No history of Diabetes Mellitus  - Hold spironolactone DOS    HEME  VTE Low Risk 0.5%             Total Score: 3    VTE: BMI greater than 39    VTE: Male      - No history of abnormal bleeding or antiplatelet use.      MSK  Patient is NOT Frail             Total Score: 0          PSYCH  - Gender dysphoria  - Anxiety, depression      The patient is aware that the final anesthesia plan will be decided by the assigned anesthesia provider on the date of service.      The patient is optimized for their procedure. AVS with information on surgery time/arrival time, meds and NPO status given by nursing staff. No further diagnostic testing indicated.      On the day of service:     Prep time: 12 minutes  Visit time: 18 minutes  Documentation time: 11 minutes  ------------------------------------------  Total time: 41 minutes      Lilliam Saxena PA-C  Preoperative Assessment Center  [facility]  Phone: [phone]  Fax: [phone]    "

## 2024-09-16 NOTE — PATIENT INSTRUCTIONS
Preparing for Your Surgery      Name:  Ria Leija   MRN:  [id number]   :  1990   Today's Date:  2024       Arriving for surgery:  Surgery date:  24  Arrival time:  5:30 am    Please come to:     [facility] [address]   (Address takes you to the [facility].)  [address] [place], [place] [address]. [place]  [address]     The Green Ramp for patients and visitors is beneath the [facility]. The parking facility entrance is at the intersection of [address] [place] [address] and [address]. Patients and visitors who self-park will receive the reduced hospital parking rate (no ticket validation needed).   [employer] parking, located at the [facility] main entrance on [address] [place] [address], is available Monday - Friday from 7 am to 3:30 pm.   Discounted parking pass options can be purchased from  attendants during business hours.     -Check in at the security desk in the [facility] ([facility] [place], operated by [facility])   Lobby. They will direct you to the correct elevators.   -Proceed to the 3rd floor, Adult Surgery Waiting Lounge. [phone]     If you need directions, a wheelchair or escort please stop at the Information Desk in the lobby.  Inform the information person you are here for surgery; a wheelchair and escort to [address] will be provided.   An escort to the Adult Surgery Waiting Lounge will be provided. .    What can I eat or drink?  -  Follow Urology Clinic instructions for starting Bowel prep and Clear Liquid diet.  -  You may have clear liquids until 2 hours prior to arrival time. (Until 3:30 am)    Examples of clear liquids:  Water  Clear broth  Juices (apple, white grape, white cranberry  and cider) without pulp  Noncarbonated, powder based beverages  (lemonade and Husam-Aid)  Sodas (Sprite, 7-Up, ginger ale and seltzer)  Coffee or tea (without milk or cream)  Gatorade    -  No Alcohol or  cannabis products for at least 24 hours before surgery.     Which medicines can I take?  Hold Aspirin for 7 days before surgery.   Hold Multivitamins for 7 days before surgery.  Hold Supplements for 7 days before surgery.  Hold Ibuprofen (Advil, Motrin) for 1 day(s) before surgery--unless otherwise directed by surgeon.  Hold Naproxen (Aleve) for 4 days before surgery.  Acetaminophen (Tylenol) is okay to take if needed.    -  DO NOT take these medications the day of surgery:  Spironolactone (Aldactone)  Hydroxyzine (Atarax)      -  PLEASE TAKE these medications the day of surgery:  Escitalopram (Lexapro)  Acetaminophen (Tylenol) if needed    How do I prepare myself?  - Please take 2 showers (one the night prior to surgery and one the morning of surgery) using Scrubcare or Hibiclens soap.   You may use your own shampoo and conditioner. No other hair products.     Use this soap only from the neck to your toes.     Leave the soap on your skin for one minute--then rinse thoroughly.   - Please remove all jewelry and body piercings.  - No lotions, deodorants or fragrance.  - No makeup or fingernail polish.   - Bring your ID and insurance card.    -If you use a CPAP machine, please bring the CPAP machine, tubing, and mask to hospital.    -If you have a Deep Brain Stimulator, Spinal Cord Stimulator, or any Neuro Stimulator device---you must bring the remote control to the hospital.      ALL PATIENTS GOING HOME THE SAME DAY OF SURGERY ARE REQUIRED TO HAVE A RESPONSIBLE ADULT TO DRIVE AND BE IN ATTENDANCE WITH THEM FOR 24 HOURS FOLLOWING SURGERY.    Covid testing policy as of 12/06/2022  Your surgeon will notify and schedule you for a COVID test if one is needed before surgery--please direct any questions or COVID symptoms to your surgeon      Questions or Concerns:    - For any questions regarding the day of surgery or your hospital stay, please contact the Pre Admission Nursing Office at [phone].       - If you have  health changes between today and your surgery, please call your surgeon.       - For questions after surgery, please call your surgeons office.           Current Visitor Guidelines    You may have 2 visitors in the pre op area.    Visiting hours: 8 a.m. to 8:30 p.m.    Patients confirmed or suspected to have symptoms of COVID 19 or flu:     No visitors allowed for adult patients.   Children (under age 18) can have 1 named visitor.     People who are sick or showing symptoms of COVID 19 or flu:    Are not allowed to visit patients--we can only make exceptions in special situations.       Please follow these guidelines for your visit:          Please maintain social distance          Masking is optional--however at times you may be asked to wear a mask for the safety of yourself and others     Clean your hands with alcohol hand . Do this when you arrive at and leave the building and patient room,    And again after you touch your mask or anything in the room.     Go directly to and from the room you are visiting.     Stay in the patient s room during your visit. Limit going to other places in the hospital as much as possible     Leave bags and jackets at home or in the car.     For everyone s health, please don t come and go during your visit. That includes for smoking   during your visit.

## 2024-09-28 ASSESSMENT — ENCOUNTER SYMPTOMS: SEIZURES: 0

## 2024-09-28 ASSESSMENT — LIFESTYLE VARIABLES: TOBACCO_USE: 0

## 2024-09-28 NOTE — ANESTHESIA PREPROCEDURE EVALUATION
Anesthesia Pre-Procedure Evaluation    Patient: Ria Leija   MRN: [id number] : 1990        Procedure : Procedure(s):  Robotic Full Depth Vaginoplasty. Possible Minimal Depth          Past Medical History:   Diagnosis Date    ADHD (attention deficit hyperactivity disorder)     Anxiety     Depression     Gender dysphoria     Migraine     Morbid obesity (H)       Past Surgical History:   Procedure Laterality Date    MYRINGOTOMY, INSERT TUBE BILATERAL, COMBINED      in childhood      Allergies   Allergen Reactions    Amoxicillin Other (See Comments)     Other Reaction(s): Unknown    States that it has never cleared anything up.    Codeine Other (See Comments)     hallucinations      Social History     Tobacco Use    Smoking status: Never    Smokeless tobacco: Never   Substance Use Topics    Alcohol use: Yes     Comment: Occasional      Wt Readings from Last 1 Encounters:   24 145.5 kg (320 lb 12.8 oz)        Anesthesia Evaluation   Pt has had prior anesthetic.     No history of anesthetic complications       ROS/MED HX  ENT/Pulmonary:     (+)     DIANNE risk factors,  hypertension, obese, daytime somnolence,  allergic rhinitis,          Intermittent, asthma (denies inhaler use; well controlled)  Treatment: Inhaler prn,              (-) tobacco use and recent URI   Neurologic:  - neg neurologic ROS   (+)      migraines,                       (-) no seizures and no CVA   Cardiovascular:     (+)  hypertension- -   -  - -                                   (-) murmur   METS/Exercise Tolerance: >4 METS    Hematologic:  - neg hematologic  ROS  (-) history of blood clots and history of blood transfusion   Musculoskeletal:  - neg musculoskeletal ROS     GI/Hepatic:  - neg GI/hepatic ROS  (-) GERD and liver disease   Renal/Genitourinary:  - neg Renal ROS  (-) renal disease   Endo:     (+)               Obesity,    (-) Type II DM   Psychiatric/Substance Use: Comment: -Gender dysphoria    (+) psychiatric history  "anxiety and depression       Infectious Disease:  - neg infectious disease ROS     Malignancy:  - neg malignancy ROS     Other:  - neg other ROS          Physical Exam    Airway        Mallampati: III   TM distance: > 3 FB   Neck ROM: full   Mouth opening: > 3 cm    Respiratory Devices and Support         Dental       (+) Multiple visibly decayed, broken teeth      Cardiovascular          Rhythm and rate: regular and normal (-) no murmur    Pulmonary           breath sounds clear to auscultation           OUTSIDE LABS:  CBC:   Lab Results   Component Value Date    WBC 11.3 (H) 09/16/2024    HGB 14.1 09/16/2024    HCT 41.0 09/16/2024     09/16/2024     BMP:   Lab Results   Component Value Date     09/16/2024    POTASSIUM 3.8 09/16/2024    CHLORIDE 103 09/16/2024    CO2 24 09/16/2024    BUN 9.8 09/16/2024    CR 0.84 09/16/2024    GLC 74 09/16/2024     COAGS: No results found for: \"PTT\", \"INR\", \"FIBR\"  POC: No results found for: \"BGM\", \"HCG\", \"HCGS\"  HEPATIC: No results found for: \"ALBUMIN\", \"PROTTOTAL\", \"ALT\", \"AST\", \"GGT\", \"ALKPHOS\", \"BILITOTAL\", \"BILIDIRECT\", \"KALPESH\"  OTHER:   Lab Results   Component Value Date    TARAS 9.3 09/16/2024       Anesthesia Plan    ASA Status:  3    NPO Status:  NPO Appropriate    Anesthesia Type: General.     - Airway: ETT   Induction: Intravenous, Propofol.   Maintenance: Balanced.   Techniques and Equipment:     - Lines/Monitors: 2nd IV, Arterial Line     Consents    Anesthesia Plan(s) and associated risks, benefits, and realistic alternatives discussed. Questions answered and patient/representative(s) expressed understanding.     - Discussed: Risks, Benefits and Alternatives for BOTH SEDATION and the PROCEDURE were discussed     - Discussed with:  Patient      - Extended Intubation/Ventilatory Support Discussed: No.      - Patient is DNR/DNI Status: No     Use of blood products discussed: No .     Postoperative Care    Pain management: IV analgesics, Oral pain medications. "   PONV prophylaxis: Ondansetron (or other 5HT-3), Dexamethasone or Solumedrol     Comments:               Leo Escobar MD    I have reviewed the pertinent notes and labs in the chart from the past 30 days and (re)examined the patient.  Any updates or changes from those notes are reflected in this note.

## 2024-09-30 ENCOUNTER — ANESTHESIA (OUTPATIENT)
Dept: SURGERY | Facility: CLINIC | Age: 34
End: 2024-09-30
Payer: COMMERCIAL

## 2024-09-30 ENCOUNTER — HOSPITAL ENCOUNTER (INPATIENT)
Facility: CLINIC | Age: 34
LOS: 5 days | Discharge: HOME | End: 2024-10-05
Attending: UROLOGY | Admitting: UROLOGY
Payer: COMMERCIAL

## 2024-09-30 DIAGNOSIS — F64.0 GENDER DYSPHORIA IN ADULT: Primary | ICD-10-CM

## 2024-09-30 LAB
CREAT SERPL-MCNC: 0.85 MG/DL (ref 0.51–1.17)
EGFRCR SERPLBLD CKD-EPI 2021: >90 ML/MIN/1.73M2
GLUCOSE BLDC GLUCOMTR-MCNC: 94 MG/DL (ref 70–99)
HGB BLD-MCNC: 13.2 G/DL (ref 11.7–17.7)

## 2024-09-30 PROCEDURE — 54125 REMOVAL OF PENIS: CPT | Mod: KX | Performed by: STUDENT IN AN ORGANIZED HEALTH CARE EDUCATION/TRAINING PROGRAM

## 2024-09-30 PROCEDURE — 999N000040 HC STATISTIC CONSULT NO CHARGE VASC ACCESS

## 2024-09-30 PROCEDURE — 53430 RECONSTRUCTION OF URETHRA: CPT | Mod: KX | Performed by: STUDENT IN AN ORGANIZED HEALTH CARE EDUCATION/TRAINING PROGRAM

## 2024-09-30 PROCEDURE — 250N000013 HC RX MED GY IP 250 OP 250 PS 637: Performed by: STUDENT IN AN ORGANIZED HEALTH CARE EDUCATION/TRAINING PROGRAM

## 2024-09-30 PROCEDURE — 55150 REMOVAL OF SCROTUM: CPT | Mod: KX | Performed by: STUDENT IN AN ORGANIZED HEALTH CARE EDUCATION/TRAINING PROGRAM

## 2024-09-30 PROCEDURE — 272N000002 HC OR SUPPLY OTHER OPNP: Performed by: UROLOGY

## 2024-09-30 PROCEDURE — 57425 LAPAROSCOPY SURG COLPOPEXY: CPT | Mod: KX | Performed by: STUDENT IN AN ORGANIZED HEALTH CARE EDUCATION/TRAINING PROGRAM

## 2024-09-30 PROCEDURE — 250N000009 HC RX 250: Performed by: EMERGENCY MEDICINE

## 2024-09-30 PROCEDURE — 258N000003 HC RX IP 258 OP 636: Performed by: STUDENT IN AN ORGANIZED HEALTH CARE EDUCATION/TRAINING PROGRAM

## 2024-09-30 PROCEDURE — 58999 UNLISTED PX FML GENITAL SYS: CPT | Mod: KX | Performed by: STUDENT IN AN ORGANIZED HEALTH CARE EDUCATION/TRAINING PROGRAM

## 2024-09-30 PROCEDURE — 250N000011 HC RX IP 250 OP 636: Mod: JZ | Performed by: UROLOGY

## 2024-09-30 PROCEDURE — 250N000025 HC SEVOFLURANE, PER MIN: Performed by: UROLOGY

## 2024-09-30 PROCEDURE — C2617 STENT, NON-COR, TEM W/O DEL: HCPCS | Performed by: UROLOGY

## 2024-09-30 PROCEDURE — 88302 TISSUE EXAM BY PATHOLOGIST: CPT | Mod: TC | Performed by: UROLOGY

## 2024-09-30 PROCEDURE — 36415 COLL VENOUS BLD VENIPUNCTURE: CPT | Performed by: STUDENT IN AN ORGANIZED HEALTH CARE EDUCATION/TRAINING PROGRAM

## 2024-09-30 PROCEDURE — 272N000001 HC OR GENERAL SUPPLY STERILE: Performed by: UROLOGY

## 2024-09-30 PROCEDURE — 250N000013 HC RX MED GY IP 250 OP 250 PS 637: Performed by: ANESTHESIOLOGY

## 2024-09-30 PROCEDURE — 250N000011 HC RX IP 250 OP 636: Performed by: STUDENT IN AN ORGANIZED HEALTH CARE EDUCATION/TRAINING PROGRAM

## 2024-09-30 PROCEDURE — 258N000003 HC RX IP 258 OP 636: Performed by: EMERGENCY MEDICINE

## 2024-09-30 PROCEDURE — 56805 CLITOROPLASTY INTERSEX STATE: CPT | Mod: KX | Performed by: STUDENT IN AN ORGANIZED HEALTH CARE EDUCATION/TRAINING PROGRAM

## 2024-09-30 PROCEDURE — 88302 TISSUE EXAM BY PATHOLOGIST: CPT | Mod: 26 | Performed by: PATHOLOGY

## 2024-09-30 PROCEDURE — 0W4M070 CREATION OF VAGINA IN MALE PERINEUM WITH AUTOLOGOUS TISSUE SUBSTITUTE, OPEN APPROACH: ICD-10-PCS | Performed by: UROLOGY

## 2024-09-30 PROCEDURE — 250N000009 HC RX 250: Performed by: NURSE ANESTHETIST, CERTIFIED REGISTERED

## 2024-09-30 PROCEDURE — 250N000011 HC RX IP 250 OP 636: Performed by: NURSE ANESTHETIST, CERTIFIED REGISTERED

## 2024-09-30 PROCEDURE — 0VTC0ZZ RESECTION OF BILATERAL TESTES, OPEN APPROACH: ICD-10-PCS | Performed by: UROLOGY

## 2024-09-30 PROCEDURE — 120N000002 HC R&B MED SURG/OB UMMC

## 2024-09-30 PROCEDURE — 258N000003 HC RX IP 258 OP 636: Performed by: NURSE ANESTHETIST, CERTIFIED REGISTERED

## 2024-09-30 PROCEDURE — 54520 REMOVAL OF TESTIS: CPT | Mod: 50 | Performed by: STUDENT IN AN ORGANIZED HEALTH CARE EDUCATION/TRAINING PROGRAM

## 2024-09-30 PROCEDURE — 999N000141 HC STATISTIC PRE-PROCEDURE NURSING ASSESSMENT: Performed by: UROLOGY

## 2024-09-30 PROCEDURE — 360N000080 HC SURGERY LEVEL 7, PER MIN: Performed by: UROLOGY

## 2024-09-30 PROCEDURE — 8E0W4CZ ROBOTIC ASSISTED PROCEDURE OF TRUNK REGION, PERCUTANEOUS ENDOSCOPIC APPROACH: ICD-10-PCS | Performed by: UROLOGY

## 2024-09-30 PROCEDURE — 14301 TIS TRNFR ANY 30.1-60 SQ CM: CPT | Mod: XS | Performed by: STUDENT IN AN ORGANIZED HEALTH CARE EDUCATION/TRAINING PROGRAM

## 2024-09-30 PROCEDURE — 36416 COLLJ CAPILLARY BLOOD SPEC: CPT | Performed by: STUDENT IN AN ORGANIZED HEALTH CARE EDUCATION/TRAINING PROGRAM

## 2024-09-30 PROCEDURE — 93010 ELECTROCARDIOGRAM REPORT: CPT | Performed by: INTERNAL MEDICINE

## 2024-09-30 PROCEDURE — 250N000011 HC RX IP 250 OP 636: Performed by: EMERGENCY MEDICINE

## 2024-09-30 PROCEDURE — 370N000017 HC ANESTHESIA TECHNICAL FEE, PER MIN: Performed by: UROLOGY

## 2024-09-30 PROCEDURE — 250N000009 HC RX 250: Performed by: UROLOGY

## 2024-09-30 PROCEDURE — 250N000011 HC RX IP 250 OP 636: Performed by: ANESTHESIOLOGY

## 2024-09-30 PROCEDURE — 85018 HEMOGLOBIN: CPT | Performed by: STUDENT IN AN ORGANIZED HEALTH CARE EDUCATION/TRAINING PROGRAM

## 2024-09-30 PROCEDURE — 82565 ASSAY OF CREATININE: CPT | Performed by: STUDENT IN AN ORGANIZED HEALTH CARE EDUCATION/TRAINING PROGRAM

## 2024-09-30 PROCEDURE — 250N000024 HC ISOFLURANE, PER MIN: Performed by: UROLOGY

## 2024-09-30 PROCEDURE — 710N000010 HC RECOVERY PHASE 1, LEVEL 2, PER MIN: Performed by: UROLOGY

## 2024-09-30 DEVICE — IMPLANTABLE DEVICE: Type: IMPLANTABLE DEVICE | Site: VAGINA | Status: FUNCTIONAL

## 2024-09-30 RX ORDER — AMOXICILLIN 250 MG
1 CAPSULE ORAL 2 TIMES DAILY
Status: DISCONTINUED | OUTPATIENT
Start: 2024-09-30 | End: 2024-10-05 | Stop reason: HOSPADM

## 2024-09-30 RX ORDER — OXYCODONE HYDROCHLORIDE 5 MG/1
10 TABLET ORAL EVERY 4 HOURS PRN
Status: DISCONTINUED | OUTPATIENT
Start: 2024-09-30 | End: 2024-10-05 | Stop reason: HOSPADM

## 2024-09-30 RX ORDER — NALOXONE HYDROCHLORIDE 0.4 MG/ML
0.1 INJECTION, SOLUTION INTRAMUSCULAR; INTRAVENOUS; SUBCUTANEOUS
Status: DISCONTINUED | OUTPATIENT
Start: 2024-09-30 | End: 2024-09-30 | Stop reason: HOSPADM

## 2024-09-30 RX ORDER — SODIUM CHLORIDE, SODIUM LACTATE, POTASSIUM CHLORIDE, CALCIUM CHLORIDE 600; 310; 30; 20 MG/100ML; MG/100ML; MG/100ML; MG/100ML
INJECTION, SOLUTION INTRAVENOUS CONTINUOUS
Status: DISCONTINUED | OUTPATIENT
Start: 2024-09-30 | End: 2024-09-30 | Stop reason: HOSPADM

## 2024-09-30 RX ORDER — CEFAZOLIN SODIUM/WATER 3 G/30 ML
3 SYRINGE (ML) INTRAVENOUS
Status: COMPLETED | OUTPATIENT
Start: 2024-09-30 | End: 2024-09-30

## 2024-09-30 RX ORDER — BISACODYL 10 MG
10 SUPPOSITORY, RECTAL RECTAL DAILY PRN
Status: DISCONTINUED | OUTPATIENT
Start: 2024-10-03 | End: 2024-10-05 | Stop reason: HOSPADM

## 2024-09-30 RX ORDER — NALOXONE HYDROCHLORIDE 0.4 MG/ML
0.2 INJECTION, SOLUTION INTRAMUSCULAR; INTRAVENOUS; SUBCUTANEOUS
Status: DISCONTINUED | OUTPATIENT
Start: 2024-09-30 | End: 2024-10-05 | Stop reason: HOSPADM

## 2024-09-30 RX ORDER — TOLTERODINE 4 MG/1
4 CAPSULE, EXTENDED RELEASE ORAL DAILY
Status: COMPLETED | OUTPATIENT
Start: 2024-09-30 | End: 2024-10-04

## 2024-09-30 RX ORDER — POLYETHYLENE GLYCOL 3350 17 G/17G
17 POWDER, FOR SOLUTION ORAL DAILY
Status: DISCONTINUED | OUTPATIENT
Start: 2024-10-01 | End: 2024-10-05 | Stop reason: HOSPADM

## 2024-09-30 RX ORDER — LIDOCAINE 40 MG/G
CREAM TOPICAL
Status: DISCONTINUED | OUTPATIENT
Start: 2024-09-30 | End: 2024-09-30 | Stop reason: HOSPADM

## 2024-09-30 RX ORDER — DEXAMETHASONE SODIUM PHOSPHATE 4 MG/ML
INJECTION, SOLUTION INTRA-ARTICULAR; INTRALESIONAL; INTRAMUSCULAR; INTRAVENOUS; SOFT TISSUE PRN
Status: DISCONTINUED | OUTPATIENT
Start: 2024-09-30 | End: 2024-09-30

## 2024-09-30 RX ORDER — ESCITALOPRAM OXALATE 10 MG/1
30 TABLET ORAL EVERY MORNING
Status: DISCONTINUED | OUTPATIENT
Start: 2024-10-01 | End: 2024-10-05 | Stop reason: HOSPADM

## 2024-09-30 RX ORDER — OXYCODONE HYDROCHLORIDE 5 MG/1
5 TABLET ORAL EVERY 6 HOURS PRN
Qty: 12 TABLET | Refills: 0 | Status: SHIPPED | OUTPATIENT
Start: 2024-09-30 | End: 2024-10-03

## 2024-09-30 RX ORDER — FENTANYL CITRATE 50 UG/ML
50 INJECTION, SOLUTION INTRAMUSCULAR; INTRAVENOUS EVERY 5 MIN PRN
Status: DISCONTINUED | OUTPATIENT
Start: 2024-09-30 | End: 2024-09-30 | Stop reason: HOSPADM

## 2024-09-30 RX ORDER — CEFAZOLIN SODIUM/WATER 3 G/30 ML
3 SYRINGE (ML) INTRAVENOUS SEE ADMIN INSTRUCTIONS
Status: DISCONTINUED | OUTPATIENT
Start: 2024-09-30 | End: 2024-09-30 | Stop reason: HOSPADM

## 2024-09-30 RX ORDER — HYDROMORPHONE HYDROCHLORIDE 1 MG/ML
0.4 INJECTION, SOLUTION INTRAMUSCULAR; INTRAVENOUS; SUBCUTANEOUS
Status: DISCONTINUED | OUTPATIENT
Start: 2024-09-30 | End: 2024-10-05 | Stop reason: HOSPADM

## 2024-09-30 RX ORDER — LIDOCAINE 40 MG/G
CREAM TOPICAL
Status: DISCONTINUED | OUTPATIENT
Start: 2024-09-30 | End: 2024-10-05 | Stop reason: HOSPADM

## 2024-09-30 RX ORDER — SODIUM CHLORIDE, SODIUM LACTATE, POTASSIUM CHLORIDE, CALCIUM CHLORIDE 600; 310; 30; 20 MG/100ML; MG/100ML; MG/100ML; MG/100ML
INJECTION, SOLUTION INTRAVENOUS CONTINUOUS PRN
Status: DISCONTINUED | OUTPATIENT
Start: 2024-09-30 | End: 2024-09-30

## 2024-09-30 RX ORDER — ONDANSETRON 4 MG/1
4 TABLET, ORALLY DISINTEGRATING ORAL EVERY 6 HOURS PRN
Status: DISCONTINUED | OUTPATIENT
Start: 2024-09-30 | End: 2024-10-05 | Stop reason: HOSPADM

## 2024-09-30 RX ORDER — HYDROXYZINE HYDROCHLORIDE 50 MG/1
100 TABLET, FILM COATED ORAL EVERY 6 HOURS PRN
Status: DISCONTINUED | OUTPATIENT
Start: 2024-09-30 | End: 2024-10-05 | Stop reason: HOSPADM

## 2024-09-30 RX ORDER — GINSENG 100 MG
CAPSULE ORAL PRN
Status: DISCONTINUED | OUTPATIENT
Start: 2024-09-30 | End: 2024-10-05 | Stop reason: HOSPADM

## 2024-09-30 RX ORDER — FENTANYL CITRATE 50 UG/ML
INJECTION, SOLUTION INTRAMUSCULAR; INTRAVENOUS PRN
Status: DISCONTINUED | OUTPATIENT
Start: 2024-09-30 | End: 2024-09-30

## 2024-09-30 RX ORDER — ACETAMINOPHEN 325 MG/1
975 TABLET ORAL ONCE
Status: DISCONTINUED | OUTPATIENT
Start: 2024-09-30 | End: 2024-09-30 | Stop reason: HOSPADM

## 2024-09-30 RX ORDER — METRONIDAZOLE 500 MG/100ML
500 INJECTION, SOLUTION INTRAVENOUS
Status: COMPLETED | OUTPATIENT
Start: 2024-09-30 | End: 2024-09-30

## 2024-09-30 RX ORDER — HEPARIN SODIUM 5000 [USP'U]/.5ML
5000 INJECTION, SOLUTION INTRAVENOUS; SUBCUTANEOUS
Status: DISCONTINUED | OUTPATIENT
Start: 2024-09-30 | End: 2024-09-30 | Stop reason: HOSPADM

## 2024-09-30 RX ORDER — NALOXONE HYDROCHLORIDE 0.4 MG/ML
0.4 INJECTION, SOLUTION INTRAMUSCULAR; INTRAVENOUS; SUBCUTANEOUS
Status: DISCONTINUED | OUTPATIENT
Start: 2024-09-30 | End: 2024-10-05 | Stop reason: HOSPADM

## 2024-09-30 RX ORDER — DEXAMETHASONE SODIUM PHOSPHATE 4 MG/ML
4 INJECTION, SOLUTION INTRA-ARTICULAR; INTRALESIONAL; INTRAMUSCULAR; INTRAVENOUS; SOFT TISSUE
Status: DISCONTINUED | OUTPATIENT
Start: 2024-09-30 | End: 2024-09-30 | Stop reason: HOSPADM

## 2024-09-30 RX ORDER — ACETAMINOPHEN 325 MG/1
650 TABLET ORAL EVERY 4 HOURS PRN
Status: DISCONTINUED | OUTPATIENT
Start: 2024-10-03 | End: 2024-10-05 | Stop reason: HOSPADM

## 2024-09-30 RX ORDER — HYDROMORPHONE HYDROCHLORIDE 1 MG/ML
0.2 INJECTION, SOLUTION INTRAMUSCULAR; INTRAVENOUS; SUBCUTANEOUS
Status: DISCONTINUED | OUTPATIENT
Start: 2024-09-30 | End: 2024-10-05 | Stop reason: HOSPADM

## 2024-09-30 RX ORDER — FENTANYL CITRATE 50 UG/ML
25 INJECTION, SOLUTION INTRAMUSCULAR; INTRAVENOUS EVERY 5 MIN PRN
Status: DISCONTINUED | OUTPATIENT
Start: 2024-09-30 | End: 2024-09-30 | Stop reason: HOSPADM

## 2024-09-30 RX ORDER — PROCHLORPERAZINE MALEATE 5 MG
10 TABLET ORAL EVERY 6 HOURS PRN
Status: DISCONTINUED | OUTPATIENT
Start: 2024-09-30 | End: 2024-10-05 | Stop reason: HOSPADM

## 2024-09-30 RX ORDER — OXYCODONE HYDROCHLORIDE 5 MG/1
10 TABLET ORAL
Status: DISCONTINUED | OUTPATIENT
Start: 2024-09-30 | End: 2024-09-30 | Stop reason: HOSPADM

## 2024-09-30 RX ORDER — ONDANSETRON 4 MG/1
4 TABLET, ORALLY DISINTEGRATING ORAL EVERY 30 MIN PRN
Status: DISCONTINUED | OUTPATIENT
Start: 2024-09-30 | End: 2024-09-30 | Stop reason: HOSPADM

## 2024-09-30 RX ORDER — ONDANSETRON 2 MG/ML
4 INJECTION INTRAMUSCULAR; INTRAVENOUS EVERY 6 HOURS PRN
Status: DISCONTINUED | OUTPATIENT
Start: 2024-09-30 | End: 2024-10-05 | Stop reason: HOSPADM

## 2024-09-30 RX ORDER — HYDROMORPHONE HYDROCHLORIDE 1 MG/ML
0.5 INJECTION, SOLUTION INTRAMUSCULAR; INTRAVENOUS; SUBCUTANEOUS EVERY 5 MIN PRN
Status: DISCONTINUED | OUTPATIENT
Start: 2024-09-30 | End: 2024-09-30 | Stop reason: HOSPADM

## 2024-09-30 RX ORDER — PROPOFOL 10 MG/ML
INJECTION, EMULSION INTRAVENOUS PRN
Status: DISCONTINUED | OUTPATIENT
Start: 2024-09-30 | End: 2024-09-30

## 2024-09-30 RX ORDER — ACETAMINOPHEN 325 MG/1
975 TABLET ORAL ONCE
Status: COMPLETED | OUTPATIENT
Start: 2024-09-30 | End: 2024-09-30

## 2024-09-30 RX ORDER — BUPIVACAINE HYDROCHLORIDE AND EPINEPHRINE 2.5; 5 MG/ML; UG/ML
INJECTION, SOLUTION INFILTRATION; PERINEURAL PRN
Status: DISCONTINUED | OUTPATIENT
Start: 2024-09-30 | End: 2024-09-30 | Stop reason: HOSPADM

## 2024-09-30 RX ORDER — LIDOCAINE HYDROCHLORIDE 20 MG/ML
INJECTION, SOLUTION INFILTRATION; PERINEURAL PRN
Status: DISCONTINUED | OUTPATIENT
Start: 2024-09-30 | End: 2024-09-30

## 2024-09-30 RX ORDER — ONDANSETRON 2 MG/ML
INJECTION INTRAMUSCULAR; INTRAVENOUS PRN
Status: DISCONTINUED | OUTPATIENT
Start: 2024-09-30 | End: 2024-09-30

## 2024-09-30 RX ORDER — OXYCODONE HYDROCHLORIDE 5 MG/1
5 TABLET ORAL EVERY 4 HOURS PRN
Status: DISCONTINUED | OUTPATIENT
Start: 2024-09-30 | End: 2024-10-05 | Stop reason: HOSPADM

## 2024-09-30 RX ORDER — SODIUM CHLORIDE 9 MG/ML
INJECTION, SOLUTION INTRAVENOUS CONTINUOUS
Status: DISCONTINUED | OUTPATIENT
Start: 2024-09-30 | End: 2024-10-04

## 2024-09-30 RX ORDER — ACETAMINOPHEN 325 MG/1
975 TABLET ORAL EVERY 8 HOURS
Status: COMPLETED | OUTPATIENT
Start: 2024-09-30 | End: 2024-10-03

## 2024-09-30 RX ORDER — PROPOFOL 10 MG/ML
INJECTION, EMULSION INTRAVENOUS CONTINUOUS PRN
Status: DISCONTINUED | OUTPATIENT
Start: 2024-09-30 | End: 2024-09-30

## 2024-09-30 RX ORDER — HYDROMORPHONE HYDROCHLORIDE 1 MG/ML
0.4 INJECTION, SOLUTION INTRAMUSCULAR; INTRAVENOUS; SUBCUTANEOUS EVERY 5 MIN PRN
Status: DISCONTINUED | OUTPATIENT
Start: 2024-09-30 | End: 2024-09-30 | Stop reason: HOSPADM

## 2024-09-30 RX ORDER — ONDANSETRON 2 MG/ML
4 INJECTION INTRAMUSCULAR; INTRAVENOUS EVERY 30 MIN PRN
Status: DISCONTINUED | OUTPATIENT
Start: 2024-09-30 | End: 2024-09-30 | Stop reason: HOSPADM

## 2024-09-30 RX ORDER — HYDROMORPHONE HYDROCHLORIDE 1 MG/ML
0.2 INJECTION, SOLUTION INTRAMUSCULAR; INTRAVENOUS; SUBCUTANEOUS EVERY 5 MIN PRN
Status: DISCONTINUED | OUTPATIENT
Start: 2024-09-30 | End: 2024-09-30 | Stop reason: HOSPADM

## 2024-09-30 RX ORDER — ENOXAPARIN SODIUM 100 MG/ML
40 INJECTION SUBCUTANEOUS EVERY 12 HOURS
Status: DISCONTINUED | OUTPATIENT
Start: 2024-10-01 | End: 2024-10-05 | Stop reason: HOSPADM

## 2024-09-30 RX ADMIN — DEXMEDETOMIDINE HYDROCHLORIDE 10 MCG: 100 INJECTION, SOLUTION INTRAVENOUS at 09:02

## 2024-09-30 RX ADMIN — SUCCINYLCHOLINE CHLORIDE 150 MG: 20 INJECTION, SOLUTION INTRAMUSCULAR; INTRAVENOUS; PARENTERAL at 07:42

## 2024-09-30 RX ADMIN — PHENYLEPHRINE HYDROCHLORIDE 100 MCG: 10 INJECTION INTRAVENOUS at 08:36

## 2024-09-30 RX ADMIN — Medication 20 MG: at 09:53

## 2024-09-30 RX ADMIN — Medication 3 G: at 07:53

## 2024-09-30 RX ADMIN — LIDOCAINE HYDROCHLORIDE 100 MG: 20 INJECTION, SOLUTION INFILTRATION; PERINEURAL at 07:40

## 2024-09-30 RX ADMIN — TOLTERODINE 4 MG: 4 CAPSULE, EXTENDED RELEASE ORAL at 16:49

## 2024-09-30 RX ADMIN — HYDROMORPHONE HYDROCHLORIDE 0.25 MG: 1 INJECTION, SOLUTION INTRAMUSCULAR; INTRAVENOUS; SUBCUTANEOUS at 11:26

## 2024-09-30 RX ADMIN — HYDROMORPHONE HYDROCHLORIDE 0.5 MG: 1 INJECTION, SOLUTION INTRAMUSCULAR; INTRAVENOUS; SUBCUTANEOUS at 12:34

## 2024-09-30 RX ADMIN — SODIUM CHLORIDE, POTASSIUM CHLORIDE, SODIUM LACTATE AND CALCIUM CHLORIDE: 600; 310; 30; 20 INJECTION, SOLUTION INTRAVENOUS at 12:06

## 2024-09-30 RX ADMIN — ACETAMINOPHEN 975 MG: 325 TABLET ORAL at 16:49

## 2024-09-30 RX ADMIN — AMITRIPTYLINE HYDROCHLORIDE 25 MG: 25 TABLET, FILM COATED ORAL at 20:20

## 2024-09-30 RX ADMIN — SODIUM CHLORIDE, SODIUM LACTATE, POTASSIUM CHLORIDE, CALCIUM CHLORIDE: 600; 310; 30; 20 INJECTION, SOLUTION INTRAVENOUS at 08:30

## 2024-09-30 RX ADMIN — HYDROMORPHONE HYDROCHLORIDE 0.5 MG: 1 INJECTION, SOLUTION INTRAMUSCULAR; INTRAVENOUS; SUBCUTANEOUS at 08:38

## 2024-09-30 RX ADMIN — Medication 20 MG: at 08:46

## 2024-09-30 RX ADMIN — SENNOSIDES AND DOCUSATE SODIUM 1 TABLET: 50; 8.6 TABLET ORAL at 20:20

## 2024-09-30 RX ADMIN — Medication 20 MG: at 09:41

## 2024-09-30 RX ADMIN — Medication 100 MG: at 07:47

## 2024-09-30 RX ADMIN — Medication 10 MG: at 11:26

## 2024-09-30 RX ADMIN — Medication 10 MG: at 09:51

## 2024-09-30 RX ADMIN — PHENYLEPHRINE HYDROCHLORIDE 100 MCG: 10 INJECTION INTRAVENOUS at 08:37

## 2024-09-30 RX ADMIN — METRONIDAZOLE 500 MG: 500 INJECTION, SOLUTION INTRAVENOUS at 07:53

## 2024-09-30 RX ADMIN — FENTANYL CITRATE 25 MCG: 50 INJECTION INTRAMUSCULAR; INTRAVENOUS at 13:35

## 2024-09-30 RX ADMIN — ONDANSETRON 4 MG: 2 INJECTION INTRAMUSCULAR; INTRAVENOUS at 12:44

## 2024-09-30 RX ADMIN — DEXMEDETOMIDINE HYDROCHLORIDE 10 MCG: 100 INJECTION, SOLUTION INTRAVENOUS at 12:34

## 2024-09-30 RX ADMIN — DEXMEDETOMIDINE HYDROCHLORIDE 10 MCG: 100 INJECTION, SOLUTION INTRAVENOUS at 11:37

## 2024-09-30 RX ADMIN — CEFAZOLIN 3 G: 10 INJECTION, POWDER, FOR SOLUTION INTRAVENOUS at 20:20

## 2024-09-30 RX ADMIN — DEXAMETHASONE SODIUM PHOSPHATE 6 MG: 4 INJECTION, SOLUTION INTRAMUSCULAR; INTRAVENOUS at 09:02

## 2024-09-30 RX ADMIN — DEXMEDETOMIDINE HYDROCHLORIDE 10 MCG: 100 INJECTION, SOLUTION INTRAVENOUS at 12:43

## 2024-09-30 RX ADMIN — ACETAMINOPHEN 975 MG: 325 TABLET ORAL at 06:09

## 2024-09-30 RX ADMIN — SUGAMMADEX 300 MG: 100 INJECTION, SOLUTION INTRAVENOUS at 13:03

## 2024-09-30 RX ADMIN — PROPOFOL 50 MCG/KG/MIN: 10 INJECTION, EMULSION INTRAVENOUS at 07:53

## 2024-09-30 RX ADMIN — Medication 3 G: at 12:02

## 2024-09-30 RX ADMIN — HYDROMORPHONE HYDROCHLORIDE 0.2 MG: 1 INJECTION, SOLUTION INTRAMUSCULAR; INTRAVENOUS; SUBCUTANEOUS at 14:14

## 2024-09-30 RX ADMIN — DEXMEDETOMIDINE HYDROCHLORIDE 20 MCG: 100 INJECTION, SOLUTION INTRAVENOUS at 08:26

## 2024-09-30 RX ADMIN — PROPOFOL 300 MG: 10 INJECTION, EMULSION INTRAVENOUS at 07:40

## 2024-09-30 RX ADMIN — ACETAMINOPHEN 975 MG: 325 TABLET ORAL at 21:44

## 2024-09-30 RX ADMIN — DEXMEDETOMIDINE HYDROCHLORIDE 10 MCG: 100 INJECTION, SOLUTION INTRAVENOUS at 12:07

## 2024-09-30 RX ADMIN — Medication 20 MG: at 09:02

## 2024-09-30 RX ADMIN — FENTANYL CITRATE 125 MCG: 50 INJECTION INTRAMUSCULAR; INTRAVENOUS at 07:40

## 2024-09-30 RX ADMIN — HYDROMORPHONE HYDROCHLORIDE 0.5 MG: 1 INJECTION, SOLUTION INTRAMUSCULAR; INTRAVENOUS; SUBCUTANEOUS at 09:14

## 2024-09-30 RX ADMIN — SODIUM CHLORIDE: 9 INJECTION, SOLUTION INTRAVENOUS at 15:29

## 2024-09-30 RX ADMIN — Medication 20 MG: at 10:43

## 2024-09-30 RX ADMIN — FENTANYL CITRATE 75 MCG: 50 INJECTION INTRAMUSCULAR; INTRAVENOUS at 08:26

## 2024-09-30 RX ADMIN — HYDROMORPHONE HYDROCHLORIDE 0.25 MG: 1 INJECTION, SOLUTION INTRAMUSCULAR; INTRAVENOUS; SUBCUTANEOUS at 11:30

## 2024-09-30 RX ADMIN — FENTANYL CITRATE 25 MCG: 50 INJECTION INTRAMUSCULAR; INTRAVENOUS at 13:41

## 2024-09-30 RX ADMIN — SODIUM CHLORIDE, POTASSIUM CHLORIDE, SODIUM LACTATE AND CALCIUM CHLORIDE: 600; 310; 30; 20 INJECTION, SOLUTION INTRAVENOUS at 07:36

## 2024-09-30 RX ADMIN — DEXMEDETOMIDINE HYDROCHLORIDE 10 MCG: 100 INJECTION, SOLUTION INTRAVENOUS at 10:43

## 2024-09-30 RX ADMIN — Medication 30 MG: at 08:24

## 2024-09-30 ASSESSMENT — ACTIVITIES OF DAILY LIVING (ADL)
ADLS_ACUITY_SCORE: 20
WALKING_OR_CLIMBING_STAIRS_DIFFICULTY: NO
ADLS_ACUITY_SCORE: 20
WEAR_GLASSES_OR_BLIND: YES
ADLS_ACUITY_SCORE: 20
CHANGE_IN_FUNCTIONAL_STATUS_SINCE_ONSET_OF_CURRENT_ILLNESS/INJURY: NO
ADLS_ACUITY_SCORE: 20
DIFFICULTY_COMMUNICATING: NO
CONCENTRATING,_REMEMBERING_OR_MAKING_DECISIONS_DIFFICULTY: NO
TOILETING_ISSUES: NO
ADLS_ACUITY_SCORE: 20
ADLS_ACUITY_SCORE: 21
FALL_HISTORY_WITHIN_LAST_SIX_MONTHS: NO
ADLS_ACUITY_SCORE: 20
DIFFICULTY_EATING/SWALLOWING: NO
DOING_ERRANDS_INDEPENDENTLY_DIFFICULTY: NO
ADLS_ACUITY_SCORE: 20
ADLS_ACUITY_SCORE: 20
DRESSING/BATHING_DIFFICULTY: NO

## 2024-09-30 NOTE — BRIEF OP NOTE
[facility]    Brief Operative Note    Pre-operative diagnosis: Gender dysphoria [F64.9]  Post-operative diagnosis Same as pre-operative diagnosis    Procedure: Robotic Full Depth Vaginoplasty, N/A - Abdomen    Surgeon: Surgeons and Role:     * Joe Espinosa MD - Primary     * Makenna Marin MD - Resident - Assisting     * Al Awad MD - Resident - Assisting     * Norman Sims MD - Fellow - Assisting  Anesthesia: General   Estimated Blood Loss: 500cc    Drains: 10Fr labial NICOLE drains x 2, 16Fr Lakhani catheter, 7 x 13cm vaginal stent, xeroform x 2   Specimens:   ID Type Source Tests Collected by Time Destination   1 : Testes, Bilateral Tissue Testes, Bilateral SURGICAL PATHOLOGY EXAM Joe Espinosa MD 9/30/2024 10:32 AM    2 : Penis and urethra Tissue Penis SURGICAL PATHOLOGY EXAM Joe Espinosa MD 9/30/2024 10:32 AM      Findings: No peritoneal flap.  Complications: None.  Implants:   Implant Name Type Inv. Item Serial No.  Lot No. LRB No. Used Action   Vagnal stent, cylindrical    Digigraph.me [id number] N/A 1 Implanted     Vaginoplasty pathway  Hgb in PACU  Lovenox 40mg qday prophylaxis to start tomorrow AM

## 2024-09-30 NOTE — OR NURSING
"PACU to Inpatient Nursing Handoff    Patient Ria Leija is a 34 year old adult who speaks English.   Procedure Procedure(s):  Robotic Full Depth Vaginoplasty   Surgeon(s) Primary: Joe Espinosa MD  Resident - Assisting: Al Awad MD; Makenna Marin MD  Fellow - Assisting: Norman Sims MD     Allergies   Allergen Reactions    Amoxicillin Other (See Comments)     Other Reaction(s): Unknown    States that it has never cleared anything up.    Codeine Other (See Comments)     hallucinations       Isolation  [unfilled]     Past Medical History   has a past medical history of ADHD (attention deficit hyperactivity disorder), Anxiety, Depression, Gender dysphoria, Migraine, and Morbid obesity (H).    Anesthesia General   Dermatome Level     Preop Meds acetaminophen (Tylenol) - time given: 0609   Nerve block Not applicable   Intraop Meds dexamethasone (Decadron)  dexmedetomidine (Precedex): 70 mcg total  fentanyl (Sublimaze): 200 mcg total  hydromorphone (Dilaudid): 2 mg total  ondansetron (Zofran): last given at 1244   Local Meds Yes   Antibiotics cefazolin (Ancef) - last given at 1202  metronidazol (Flagyl) - last given at 0753     Pain Patient Currently in Pain: yes   PACU meds  fentanyl (Sublimaze): 50 mcg (total dose) last given at 1341   hydromorphone (Dilaudid): 0.2 mg (total dose) last given at 1414    PCA / epidural No   Capnography Respiratory Monitoring (EtCO2): 40 mmHg   Telemetry     Inpatient Telemetry Monitor Ordered? No        Labs Glucose Lab Results   Component Value Date    GLC 94 09/30/2024       Hgb Lab Results   Component Value Date    HGB 14.1 09/16/2024       INR No results found for: \"INR\"   PACU Imaging Not applicable     Wound/Incision Incision/Surgical Site 09/30/24 Vagina (Active)   Closure Sutures 09/30/24 1254   Dressing Intervention New dressing applied 09/30/24 1254   Number of days: 0       Incision/Surgical Site 09/30/24 Right;Upper Abdomen (Active)   Closure Sutures;Liquid " bandage 09/30/24 1217   Number of days: 0       Incision/Surgical Site 09/30/24 Right;Upper Abdomen (Active)   Closure Sutures;Liquid bandage 09/30/24 1217   Number of days: 0       Incision/Surgical Site 09/30/24 Upper;Midline Abdomen (Active)   Closure Sutures;Liquid bandage 09/30/24 1217   Number of days: 0       Incision/Surgical Site 09/30/24 Left;Upper Abdomen (Active)   Closure Sutures;Liquid bandage 09/30/24 1217   Number of days: 0       Incision/Surgical Site 09/30/24 Left;Upper Abdomen (Active)   Closure Sutures;Liquid bandage 09/30/24 1217   Number of days: 0      CMS        Equipment Not applicable   Other LDA       IV Access Peripheral IV 09/30/24 Anterior;Left Foot (Active)   Site Assessment WDL 09/30/24 0700   Line Status Saline locked 09/30/24 0700   Dressing Transparent 09/30/24 0700   Dressing Status clean;dry;intact 09/30/24 0700   Phlebitis Scale 0-->no symptoms 09/30/24 0700   Infiltration? no 09/30/24 0700   Number of days: 0       Peripheral IV 09/30/24 Right Hand (Active)   Number of days: 0       Arterial Line 09/30/24 Radial (Active)   Number of days: 0      Blood Products Not applicable  mL   Intake/Output Date 09/30/24 0700 - 10/01/24 0659   Shift 3802-2430 6896-2633 6140-4915 24 Hour Total   INTAKE   I.V. 2300   2300   Shift Total(mL/kg) 2300(15.78)   2300(15.78)   OUTPUT   Blood 500   500   Shift Total(mL/kg) 500(3.43)   500(3.43)   Weight (kg) 145.8 145.8 145.8 145.8      Drains / Lakhani Closed/Suction Drain 1 Other (Comment) Bulb 10 Uzbek (Active)   Number of days: 0       Closed/Suction Drain 2 Other (Comment) Bulb 10 Uzbek (Active)   Number of days: 0       Urethral Catheter 09/30/24 Non-latex 16 fr (Active)   Number of days: 0      Time of void PreOp Time of Void Prior to Procedure: 0400 (09/30/24 0605)    PostOp      Diapered? No   Bladder Scan     PO    water     Vitals    B/P: 114/74  T: 98.6  F (37  C)    Temp src: Axillary  P:  Pulse: 111 (09/30/24 1335)          R:  20  O2:  SpO2: 95 %    O2 Device: Nasal cannula (09/30/24 1335)    Oxygen Delivery: 4 LPM (09/30/24 1335)         Family/support present significant other   Patient belongings     Patient transported on cart   DC meds/scripts (obs/outpt) Not applicable   Inpatient Pain Meds Released? Yes       Special needs/considerations None   Tasks needing completion None       Kim Abraham, RN  Kaylee

## 2024-09-30 NOTE — PROGRESS NOTES
6MS ADMISSION    D: Patient admitted/transferred from  [facility] via  Bed for Post vaginoplasty recovery    I: Upon arrival to the unit patient was oriented to room, unit, and call light. Patient s height, weight, and vital signs were obtained. Allergies reviewed and allergy band applied. Provider notified of patient s arrival on the unit. Adult AVS completed. Head to toe assessment completed. Education assessment completed. Care plan initiated.    A: Vital signs stable upon admission. Patient rates pain at 0.Two RN skin assessment completed yes. Second RN was Ruth BRAR  Significant Skin Findings include  surgery site cover with dressing. [facility] Nurse Consult Ordered NO.  P: Continue to monitor patient s  Vitals and pain and intervene as needed. Continue with plan of care. Notify provider with any concerns or changes in patient status.

## 2024-09-30 NOTE — ANESTHESIA POSTPROCEDURE EVALUATION
Patient: Ria Leija    Procedure: Procedure(s):  Robotic Full Depth Vaginoplasty       Anesthesia Type:  General    Note:  Disposition: Inpatient   Postop Pain Control: Uneventful            Sign Out: Well controlled pain   PONV: No   Neuro/Psych: Uneventful            Sign Out: Acceptable/Baseline neuro status   Airway/Respiratory: Uneventful            Sign Out: Acceptable/Baseline resp. status   CV/Hemodynamics: Uneventful            Sign Out: Acceptable CV status; No obvious hypovolemia; No obvious fluid overload   Other NRE: NONE   DID A NON-ROUTINE EVENT OCCUR? No    Event details/Postop Comments:  Found to have infiltration of 18 in left fore arm. Noted bilateral numbess in hands. Counselled that this may be the result of infiltraiton, BP Cuff or other positioning in OR. Anticipate improvement but encouraged to reach out to Anesthesia if not improving.            Last vitals:  Vitals Value Taken Time   /74 09/30/24 1330   Temp 37  C (98.6  F) 09/30/24 1321   Pulse 109 09/30/24 1401   Resp 20 09/30/24 1401   SpO2 97 % 09/30/24 1401   Vitals shown include unfiled device data.    Electronically Signed By: Linda Osorio MD  September 30, 2024  2:02 PM

## 2024-09-30 NOTE — OP NOTE
OPERATIVE REPORT  2024    Preoperative Diagnosis:  1. Gender Dysphoria  2. Morbid obesity (Body mass index is 50.33 kg/m .)    Postoperative Diagnosis:  same     Robotic Assisted Laparoscopic Penile inversion vaginoplasty, which includes the followin. Robotic Assisted Laparoscopic Creation of Artificial Vagina with Graft (penile skin flap + scrotal full thickness grafts)  2. Total Penectomy  3. Urethroplasty  4. Robotic Assisted Laparoscopy, surgical, colpopexy (suspension of vaginal apex)  5. Scrotectomy  6. Bilateral labiaplasty via adjacent tissue transfer of scrotal and mons skin flaps, size 12 x 5 cm on left and 12 x 5 cm on right.    7. Clitoroplasty  8. Bilateral Simple Scrotal Orchiectomy      SURGEON: Joe Espinosa MD  FELLOW: Norman Sims MD  RESIDENT: Makenna Marin MD (PGY-4) and Al Awad MD (PGY-2)     ANESTHESIA: General  EBL: 150 mL  DRAINS: 16Fr Lakhani, 10Fr labial drains x 2, Vaginal Stent (4 x 13cm)  SPECIMENS: penis, urethra, bilateral testicles     INDICATIONS:   Ria Leija is a 34 year old year old transgender female with gender dysphoria meeting WPATH criteria for gender-affirming surgery. She has not undergone prior orchiectomy. She completed all appropriate preoperative workup prior to surgery today. After a discussion of all risks, benefits, and alternatives, the patient elected to proceed with the above listed procedures.      DESCRIPTION OF PROCEDURE:   After informed consent was obtained and preoperative antibiotics were given, the patient was taken to the operating room, placed supine on the operating table. SCDs and preoperative subcutaneous heparin were utilized for VTE prophylaxis. General anesthesia was induced and she was intubated. The patient was placed in lithotomy. The genitalia and lower abdomen were prepped and draped in a sterile fashion. A timeout was performed.     We opted for an early excision approach to scrotectomy. A butterfly shaped  incision was made over the scrotum to excise extra skin. The scrotum was removed in entirety using electrocautery.  We then thinned the full thickness scrotal skin grafts on the back table. The scrotal graft was formed over a large dilator into a conical vaginal construct with 3-0 Vicryl suture.    We then turned our attention to orchiectomy. The right testis was isolated by dissecting it free of its dartos attachments and this dissection was carried proximally along the spermatic cord to the external inguinal ring using electrocautery to free any cremasteric fibers. Once we reached the inguinal ring we placed a large clamp on the proximal spermatic cord. We divided the cord in half between two Beatriz clamps more distal and sharply removed the testicle distal to the clamps. A 0 silk was used to suture ligate both stumps of the spermatic cord underneath our Beatriz clamps. Lastly, we placed one more 0 silk suture ligature around the entire cord just proximal to our large clamp. The clamps was released and there was no bleeding. We performed the same steps on the left side. Specimens were passed off the field and sent to pathology.     We then made a circumcising skin incision with care to leave a mucosal collar beneath the glans. This was dissected down to Muñoz's fascia. The penis was degloved, which left a circumferential penile skin tube. The anterior urethra was freed from the corporal bodies. Circumferential mobilization was performed to the distal bulbar urethra. This was done using sharp dissection. This  the erectile tissue from the urethra completely.     We then turned our attention to the glans in order to perform a clitoroplasty. A W-shaped incision was marked along the glans to create the clitoris. Bovie cautery was used to delineate the lateral borders of the neurovascular bundle along the penile shaft. A combination of blunt and sharp dissection with tenotomy scissors was used to dissect the  penile glans and its neurovascular bundle. Dissection was performed along the under surface of the tunica albuginea - freeing the corporal spongy tissue. This ensured that the neurovascular bundle was left unharmed. We shaped and sutured the neoclitoris in a narrow tubular stricture and reshaping the mucosal collar to form a clitoral barba. The neoclitoris was sewn into its appropriate midline location in Vicryl suture.       We then dissected the cavernosal bodies to the level of the pubic symphysis and then amputated the ventral corporal bodies bilaterally at this level and passed the specimen off to pathology. The stumps were closed with running PDS suture.      The urethra was transected at the level of the distal bulbar urethra. The urethra was spatulated for about 3cm ventrally, allowing the dorsal aspect to be sewn to the neoclitoral and clitoral barba skin dorsally using 3-0 Vicryl interrupted suture - thus creating a mucosal, lubricated inner vulvar vestibule.  The urethral spatulation ended at the mid bulbar urethra.      We then turned our attention to creating a midline opening superior to the penile skin tube. This midline opening would later become the opening for the clitoris and urethra. The scrotal graft was approximated into a tube over a dilator and then sewn to the penile skin tube over the same dilator with interrupted vicryl suture.      The wound was then irrigated with hemostasis obtained. The penile skin tube was then sewn to the scrotal skin graft over the blue vaginal dilator.     A 16F Lakhani catheter was inserted. A 2 cm incision was made above the umbilicus and the abdomen was insufflated with a Veress needle. The remaining 4 ports for a total of 5 were placed in the standard fashion with the assistant port in the left paramedian location, 2 robotic ports on the right side, 1 robotic port on the left side and the camera in the supraumbilical region.      Peritoneal incision was made in the  posterior cul de sac. Vasa and seminal vesicles were encountered, and we dissected beneath these on Denonvillier's fascia. We traversed as distal as possible with the prostate up and the rectum down. This was generally an avascular plane.     We then returned to distal canal dissection. Midline dissection was used to identify the corpus spongiosum and the bulbospongiosus muscle. Muscle was left in place during this portion. We traveled inferior to bulbospongiosus muscle. We transected the central tendon. Dissection then continued to develop a space between the urethra and prostate anteriorly and the rectum posteriorly from the perineum. This would ultimately become a space for the neovagina. We performed the peritoneal and perineal dissections simultaneously until the two planes met.     A vicryl stitch was used to debra 12 o'clock. The robot was re-docked. The vaginal construct with graft was then inverted into the vaginal cavity and visualized with the robot. The apex of the graft was tagged with a suture to aid in identification robotically.     Using 3-0 V Loc, we sutured the skin graft to the peritoneal edge across the midline to close the peritoneal opening as well as suspend the vaginal construct/canal internally to suspend it internally to prevent prolapse. This completed the colpopexy.      The ventral urethra was then sutured to the posterior aspect of the superiorly placed opening to complete the urethroplasty and clitoral hooding using Vicryl suture. There was excellent urethral mucosal eversion with pink mucosa evident.  A 16 Fr urethral Lakhani catheter was placed.       A drain was placed underneath each labia majora for two drains total. Drain sutures were placed. We designed the labia majora using anteriorly perfused scrotal and mons skin. These labial flaps were 12x5cm per side. These were fashioned to create labia majora. These were sutured into place using 3-0 Vicryl and Monocryl sutures. The  inferior aspect of the penile skin tube was attached to the inferior aspect of the perineal incision to complete the closure.    We did make additional incisions laterally to the midline to create labia minora. Plicating 3-0 PDS sutures were used to create a clitoral barba and folds of labia minora. Skin was closed with 5-0 monocryl.     The other skin was closed with 3-0 Monocryl.     A vaginal stent (4 x 13 cm) was placed. The robotic ports were removed and the incisions were closed using 4-0 monocryl suture and covered with skin glue.     All counts were correct at the end of the case.  The clitoris was pink and viable at the end of the case.  The Lakhani was intact and draining clear urine.  A pressure dressing was applied and sutured in place using Prolene sutures.  The patient was then extubated, awakened, and transferred to the postop area in stable condition.     As attending surgeon, Joe SCHNEIDER MD, was present for the entire procedure.

## 2024-09-30 NOTE — ANESTHESIA PROCEDURE NOTES
Arterial Line Procedure Note    Pre-Procedure   Staff -        Anesthesiologist:  Linda Osorio MD       Performed By: anesthesiologist       Location: OR       Pre-Anesthestic Checklist: patient identified, IV checked, risks and benefits discussed, informed consent, monitors and equipment checked, pre-op evaluation and at physician/surgeon's request  Timeout:       Correct Patient: Yes        Correct Procedure: Yes        Correct Site: Yes        Correct Position: Yes   Line Placement:   This line was placed Post Induction starting at 9/30/2024 7:51 AM and ending at 9/30/2024 7:53 AM  Procedure   Procedure: arterial line       Laterality: left       Insertion Site: radial.  Sterile Prep        Skin prep: Chloraprep  Insertion/Injection        Technique: ultrasound guided, Seldinger Technique and Chu's test completed        1. Ultrasound was used to evaluate the access site.       2. Artery evaluated via ultrasound for patency/adequacy.       3. Using real-time ultrasound the needle/catheter was observed entering the artery/vein.       5. The visualized structures were anatomically normal.       6. There were no apparent abnormal pathologic findings.       Catheter Type/Size: 20 G, 12 cm  Narrative         Secured by: other       Tegaderm dressing used.       Complications: None apparent,        Arterial waveform: Yes        IBP within 10% of NIBP: Yes

## 2024-09-30 NOTE — CONSULTS
"Consult received for Vascular access care.  For additional needs place \"Nursing to Consult for Vascular Access\" [id number] order in EPIC.  "

## 2024-09-30 NOTE — ANESTHESIA CARE TRANSFER NOTE
Patient: Ria Leija    Procedure: Procedure(s):  Robotic Full Depth Vaginoplasty       Diagnosis: Gender dysphoria [F64.9]  Diagnosis Additional Information: No value filed.    Anesthesia Type:   General     Note:    Oropharynx: oropharynx clear of all foreign objects and spontaneously breathing  Level of Consciousness: drowsy  Oxygen Supplementation: face mask  Level of Supplemental Oxygen (L/min / FiO2): 6  Independent Airway: airway patency satisfactory and stable  Dentition: dentition unchanged  Vital Signs Stable: post-procedure vital signs reviewed and stable  Report to RN Given: handoff report given  Patient transferred to: PACU    Handoff Report: Identifed the Patient, Identified the Reponsible Provider, Reviewed the pertinent medical history, Discussed the surgical course, Reviewed Intra-OP anesthesia mangement and issues during anesthesia, Set expectations for post-procedure period and Allowed opportunity for questions and acknowledgement of understanding      Vitals:  Vitals Value Taken Time   /74 09/30/24 1330   Temp 37.0    Pulse 110 09/30/24 1332   Resp 11 09/30/24 1332   SpO2 97 % 09/30/24 1331   Vitals shown include unfiled device data.    Electronically Signed By: DAVID Leung CRNA  September 30, 2024  1:32 PM

## 2024-09-30 NOTE — ANESTHESIA PROCEDURE NOTES
Airway       Patient location during procedure: OR       Procedure Start/Stop Times: 9/30/2024 7:44 AM  Staff -        Anesthesiologist:  Linda Osorio MD       CRNA: Taya Gray APRN CRNA       Performed By: CRNA  Consent for Airway        Urgency: elective  Indications and Patient Condition       Indications for airway management: matt-procedural       Induction type:intravenous       Mask difficulty assessment: 1 - vent by mask    Final Airway Details       Final airway type: endotracheal airway       Successful airway: ETT - single  Endotracheal Airway Details        ETT size (mm): 7.5       Cuffed: yes       Successful intubation technique: video laryngoscopy       VL Blade Size: Glidescope 3       Grade View of Cords: 1       Adjucts: stylet       Position: Right       Measured from: gums/teeth       Secured at (cm): 23       Bite block used: None    Post intubation assessment        Placement verified by: capnometry        Number of attempts at approach: 1       Secured with: tape       Ease of procedure: easy       Dentition: Intact    Medication(s) Administered   Medication Administration Time: 9/30/2024 7:44 AM

## 2024-10-01 ENCOUNTER — APPOINTMENT (OUTPATIENT)
Dept: PHYSICAL THERAPY | Facility: CLINIC | Age: 34
DRG: 876 | End: 2024-10-01
Attending: UROLOGY
Payer: COMMERCIAL

## 2024-10-01 LAB
ANION GAP SERPL CALCULATED.3IONS-SCNC: 9 MMOL/L (ref 7–15)
ATRIAL RATE - MUSE: 94 BPM
BUN SERPL-MCNC: 7.7 MG/DL (ref 6–20)
CALCIUM SERPL-MCNC: 8.3 MG/DL (ref 8.8–10.4)
CHLORIDE SERPL-SCNC: 102 MMOL/L (ref 98–107)
CREAT SERPL-MCNC: 0.78 MG/DL (ref 0.51–1.17)
DIASTOLIC BLOOD PRESSURE - MUSE: NORMAL MMHG
EGFRCR SERPLBLD CKD-EPI 2021: >90 ML/MIN/1.73M2
ERYTHROCYTE [DISTWIDTH] IN BLOOD BY AUTOMATED COUNT: 12.8 % (ref 10–15)
GLUCOSE SERPL-MCNC: 127 MG/DL (ref 70–99)
HCO3 SERPL-SCNC: 24 MMOL/L (ref 22–29)
HCT VFR BLD AUTO: 34.8 % (ref 35–53)
HGB BLD-MCNC: 11.7 G/DL (ref 11.7–17.7)
INTERPRETATION ECG - MUSE: NORMAL
MCH RBC QN AUTO: 31 PG (ref 26.5–33)
MCHC RBC AUTO-ENTMCNC: 33.6 G/DL (ref 31.5–36.5)
MCV RBC AUTO: 92 FL (ref 78–100)
P AXIS - MUSE: 46 DEGREES
PLATELET # BLD AUTO: 238 10E3/UL (ref 150–450)
POTASSIUM SERPL-SCNC: 4 MMOL/L (ref 3.4–5.3)
PR INTERVAL - MUSE: 154 MS
QRS DURATION - MUSE: 88 MS
QT - MUSE: 354 MS
QTC - MUSE: 442 MS
R AXIS - MUSE: 4 DEGREES
RBC # BLD AUTO: 3.77 10E6/UL (ref 3.8–5.9)
SODIUM SERPL-SCNC: 135 MMOL/L (ref 135–145)
SYSTOLIC BLOOD PRESSURE - MUSE: NORMAL MMHG
T AXIS - MUSE: 19 DEGREES
VENTRICULAR RATE- MUSE: 94 BPM
WBC # BLD AUTO: 17 10E3/UL (ref 4–11)

## 2024-10-01 PROCEDURE — 258N000003 HC RX IP 258 OP 636: Performed by: STUDENT IN AN ORGANIZED HEALTH CARE EDUCATION/TRAINING PROGRAM

## 2024-10-01 PROCEDURE — 93005 ELECTROCARDIOGRAM TRACING: CPT

## 2024-10-01 PROCEDURE — 97161 PT EVAL LOW COMPLEX 20 MIN: CPT | Mod: GP

## 2024-10-01 PROCEDURE — 82310 ASSAY OF CALCIUM: CPT | Performed by: STUDENT IN AN ORGANIZED HEALTH CARE EDUCATION/TRAINING PROGRAM

## 2024-10-01 PROCEDURE — 85027 COMPLETE CBC AUTOMATED: CPT | Performed by: STUDENT IN AN ORGANIZED HEALTH CARE EDUCATION/TRAINING PROGRAM

## 2024-10-01 PROCEDURE — 250N000011 HC RX IP 250 OP 636: Performed by: STUDENT IN AN ORGANIZED HEALTH CARE EDUCATION/TRAINING PROGRAM

## 2024-10-01 PROCEDURE — 120N000002 HC R&B MED SURG/OB UMMC

## 2024-10-01 PROCEDURE — 97116 GAIT TRAINING THERAPY: CPT | Mod: GP

## 2024-10-01 PROCEDURE — 250N000013 HC RX MED GY IP 250 OP 250 PS 637: Performed by: STUDENT IN AN ORGANIZED HEALTH CARE EDUCATION/TRAINING PROGRAM

## 2024-10-01 PROCEDURE — 97530 THERAPEUTIC ACTIVITIES: CPT | Mod: GP

## 2024-10-01 PROCEDURE — 80048 BASIC METABOLIC PNL TOTAL CA: CPT | Performed by: STUDENT IN AN ORGANIZED HEALTH CARE EDUCATION/TRAINING PROGRAM

## 2024-10-01 PROCEDURE — 36415 COLL VENOUS BLD VENIPUNCTURE: CPT | Performed by: STUDENT IN AN ORGANIZED HEALTH CARE EDUCATION/TRAINING PROGRAM

## 2024-10-01 RX ADMIN — OXYCODONE HYDROCHLORIDE 5 MG: 5 TABLET ORAL at 21:10

## 2024-10-01 RX ADMIN — SODIUM CHLORIDE: 9 INJECTION, SOLUTION INTRAVENOUS at 08:59

## 2024-10-01 RX ADMIN — SODIUM CHLORIDE: 9 INJECTION, SOLUTION INTRAVENOUS at 00:05

## 2024-10-01 RX ADMIN — ENOXAPARIN SODIUM 40 MG: 40 INJECTION SUBCUTANEOUS at 08:53

## 2024-10-01 RX ADMIN — CEFAZOLIN 3 G: 10 INJECTION, POWDER, FOR SOLUTION INTRAVENOUS at 11:24

## 2024-10-01 RX ADMIN — ESCITALOPRAM OXALATE 30 MG: 10 TABLET ORAL at 08:53

## 2024-10-01 RX ADMIN — AMITRIPTYLINE HYDROCHLORIDE 25 MG: 25 TABLET, FILM COATED ORAL at 21:10

## 2024-10-01 RX ADMIN — CEFAZOLIN 3 G: 10 INJECTION, POWDER, FOR SOLUTION INTRAVENOUS at 05:13

## 2024-10-01 RX ADMIN — ENOXAPARIN SODIUM 40 MG: 40 INJECTION SUBCUTANEOUS at 21:10

## 2024-10-01 RX ADMIN — ACETAMINOPHEN 975 MG: 325 TABLET ORAL at 11:33

## 2024-10-01 RX ADMIN — SENNOSIDES AND DOCUSATE SODIUM 1 TABLET: 50; 8.6 TABLET ORAL at 21:09

## 2024-10-01 RX ADMIN — ACETAMINOPHEN 975 MG: 325 TABLET ORAL at 06:40

## 2024-10-01 RX ADMIN — SENNOSIDES AND DOCUSATE SODIUM 1 TABLET: 50; 8.6 TABLET ORAL at 08:53

## 2024-10-01 RX ADMIN — TOLTERODINE 4 MG: 4 CAPSULE, EXTENDED RELEASE ORAL at 08:53

## 2024-10-01 RX ADMIN — POLYETHYLENE GLYCOL 3350 17 G: 17 POWDER, FOR SOLUTION ORAL at 08:53

## 2024-10-01 RX ADMIN — CEFAZOLIN 3 G: 10 INJECTION, POWDER, FOR SOLUTION INTRAVENOUS at 21:17

## 2024-10-01 RX ADMIN — ACETAMINOPHEN 975 MG: 325 TABLET ORAL at 22:28

## 2024-10-01 ASSESSMENT — ACTIVITIES OF DAILY LIVING (ADL)
ADLS_ACUITY_SCORE: 28
ADLS_ACUITY_SCORE: 28
ADLS_ACUITY_SCORE: 21
ADLS_ACUITY_SCORE: 27
ADLS_ACUITY_SCORE: 28
ADLS_ACUITY_SCORE: 27
ADLS_ACUITY_SCORE: 28
ADLS_ACUITY_SCORE: 27
ADLS_ACUITY_SCORE: 28
ADLS_ACUITY_SCORE: 21
ADLS_ACUITY_SCORE: 27
ADLS_ACUITY_SCORE: 28
ADLS_ACUITY_SCORE: 21
ADLS_ACUITY_SCORE: 28
ADLS_ACUITY_SCORE: 28
ADLS_ACUITY_SCORE: 27
ADLS_ACUITY_SCORE: 28

## 2024-10-01 NOTE — PLAN OF CARE
Physical Therapy Discharge Summary    Reason for therapy discharge:    All goals and outcomes met, no further needs identified.    Progress towards therapy goal(s). See goals on Care Plan in [facility] electronic health record for goal details.  Goals met    Therapy recommendation(s):    No further therapy is recommended.  Pt up IND within surgical precautions, please encourage OOB activity and hallway ambulation during remainder of admission.

## 2024-10-01 NOTE — PLAN OF CARE
"  Problem: Adult Inpatient Plan of Care  Goal: Plan of Care Review  Description: T  VS:  /72 (BP Location: Right arm, Patient Position: Supine, Cuff Size: Adult Large)   Pulse 88   Temp 98.2  F (36.8  C) (Oral)   Resp 16   Ht 1.702 m (5' 7.01\")   Wt 145.8 kg (321 lb 6.9 oz)   SpO2 95%   BMI 50.33 kg/m      O2:  Capno / O2 1 Liter    Output:  Continent of Bowel / Lakhani Cath    Last BM:  09/30-24   Activity:  Independent / SBA / Walker    Up for meals?  Yes    Skin:   Vaginoplasty Incision   Pain:  Denies    CMS:  A&OX 3-4   Dressing:  Yes    Diet:  Regular    LDA:  Right PIV Infusing  ml Hr   Equipment:  Personal Belongings    Plan:  POC   Additional Info:  Gender Dysphoria   Bilateral Numbness Hands   Gauze Packing   No Urinary retention        10/1/2024 1014 by Ruth Marquis RN  Outcome: Progressing  10/1/2024 1013 by Ruth Marqusi RN  Outcome: Progressing  Goal: Patient-Specific Goal (Individualized)  Description: You can add care plan individualizations to a care plan. Examples of Individualization might be:  \"Parent requests to be called daily at 9am for status\", \"I have a hard time hearing out of my right ear\", or \"Do not touch me to wake me up as it startles  me\".  10/1/2024 1014 by Ruth Marquis RN  Outcome: Progressing  10/1/2024 1013 by Ruth Marquis RN  Outcome: Progressing  Goal: Absence of Hospital-Acquired Illness or Injury  10/1/2024 1014 by Ruth Marquis, RN  Outcome: Progressing  10/1/2024 1013 by Ruth Marquis, RN  Outcome: Progressing  Goal: Optimal Comfort and Wellbeing  10/1/2024 1014 by Ruth Marquis RN  Outcome: Progressing  10/1/2024 1013 by Ruth Marquis, RN  Outcome: Progressing  Goal: Readiness for Transition of Care  10/1/2024 1014 by Ruth Marquis, RN  Outcome: Progressing  10/1/2024 1013 by Ruth Marquis RN  Outcome: Progressing   Goal Outcome Evaluation:      "

## 2024-10-01 NOTE — PROGRESS NOTES
"[id number]    VS: /80 (BP Location: Right arm)   Pulse 109   Temp 98.1  F (36.7  C) (Oral)   Resp 20   Ht 1.702 m (5' 7.01\")   Wt 145.8 kg (321 lb 6.9 oz)   SpO2 97%   BMI 50.33 kg/m     O2: Stable O2 via NC   Output: Continent of bowel and bladder, voids spontaneously w/o difficulty   Last BM: 9/29   Activity: Patient is on bedrest after her surgery   Skin: X patient had vaginoplasty   Pain: Schedule meds given, not having a lot of pain per patient report              CMS: A&Ox4 denies chest pain, n/v, numbness/tingling, and dizziness   Dressing: Vaginoplasty    Diet: Regular diet   LDA: RPIV infusing  ml /hr    Equipment: Personal belongings   Plan: Call light in reach, continue POC   Additional Info:        Patient complaining about numbness and swelling on her L upper arm, ice applied to the area.  "

## 2024-10-01 NOTE — PLAN OF CARE
Problem: Suicide Risk  Goal: Absence of Self-Harm  Outcome: Progressing   Goal Outcome Evaluation:

## 2024-10-01 NOTE — PROGRESS NOTES
"Shift 5922-2233    Goal Outcome Evaluation:       Plan of Care Reviewed With: patient     Overall Patient Progress: progressing     Outcome Evaluation:     VS: /71   Pulse 109   Temp 99.6  F (37.6  C) (Oral)   Resp 20   Ht 1.702 m (5' 7.01\")   Wt 145.8 kg (321 lb 6.9 oz)   SpO2 99%   BMI 50.33 kg/m     O2: SpO2 >92% and stable on 2L NC. Denies chest pain and SOB.    Output: Lakhani Cath in place. Continent of bowel.    Last BM: 09/29 LBM   Activity: Ax1/SBA not OOB during shift.    Skin: WDL except abd lap incisions x 5, s/p vaginoplasty    Pain: Pain managed with scheduled tylenol.   CMS: Intact, AOx4. Reports some N/T and swelling in the L hand and fingers.    Dressing: Intact with dried drainage.   Diet: Regular diet. Denies nausea/vomiting.    LDA: R PIV infusing  ml/hr.    Equipment: IV pole, personal belongings, and call light in reach.    Plan: Continue with plan of care. Call light within reach, pt able to make needs known.    Additional Info:       "

## 2024-10-01 NOTE — PHARMACY-ADMISSION MEDICATION HISTORY
Pharmacist Admission Medication History    Admission medication history is complete. The information provided in this note is only as accurate as the sources available at the time of the update.    Information Source(s): Clinic records and CareEverywhere/SureScripts, Medication last doses recorded by pre-op RN    Pertinent Information:   Per urology - resume estradiol and Provera once discharged; stop spironolactone and follow up with hormone specialist in 1-3 months     Changes made to PTA medication list: none     Allergies reviewed with patient and updates made in EHR: no    Medication History Completed By: VINCE MANZO [facility] 9/30/2024 8:42 PM    Prior to Admission medications    Medication Sig Last Dose Taking? Auth Provider Long Term End Date   amitriptyline (ELAVIL) 25 MG tablet Take 1 tablet by mouth every evening Past Week Yes Reported, Patient Yes    escitalopram (LEXAPRO) 20 MG tablet Take 1.5 tablets by mouth every morning. 9/29/2024 at 0900 Yes Reported, Patient Yes    estradiol (ESTRACE) 2 MG tablet Take 2 tablets by mouth 2 times daily Past Week Yes Reported, Patient Yes    hydrOXYzine (ATARAX) 50 MG tablet Take 100 mg by mouth every 6 hours as needed. Past Week Yes Reported, Patient     medroxyPROGESTERone (PROVERA) 10 MG tablet Take 10 mg by mouth every morning. Past Week Yes Reported, Patient     oxyCODONE (ROXICODONE) 5 MG tablet Take 1 tablet (5 mg) by mouth every 6 hours as needed for pain.  Yes Joe Espinosa MD  10/3/24

## 2024-10-01 NOTE — PROGRESS NOTES
"Urology  Progress Note    The author of this note may not be on call. To avoid delays in patient care please use AMCOM to find correct on call person    24 hour events/Subjective:     - No acute events overnight   - Pain well controlled on current regimen    - Tolerating regular diet ; no nausea or vomiting   - Passing flatus, no bowel movement   - Not yet ambulating.      Exam  /72 (BP Location: Right arm, Patient Position: Supine, Cuff Size: Adult Large)   Pulse 88   Temp 98.2  F (36.8  C) (Oral)   Resp 16   Ht 1.702 m (5' 7.01\")   Wt 145.8 kg (321 lb 6.9 oz)   SpO2 95%   BMI 50.33 kg/m    No acute distress  Unlabored breathing on RA  Abdomen soft, appropriately tender, nondistended. Incisions cdi  Bolster in place, moderately saturated  Lakhani draining clear yellow urine      Intake/Output Summary (Last 24 hours) at 10/1/2024 0832  Last data filed at 10/1/2024 0647  Gross per 24 hour   Intake 3134 ml   Output 4165 ml   Net -1031 ml       UOP 3580/-  L marly 40  R marly 45    Labs  Recent Labs   Lab Test 10/01/24  0655 09/30/24  1501 09/30/24  1408 09/16/24  1448   WBC 17.0*  --   --  11.3*   HGB 11.7  --  13.2 14.1   CR 0.78 0.85  --  0.84          Assessment/Plan  34 year old y/o adult POD#1 s/p full depth vaginoplasty    Note - plan changes for today are in bold below.    Neuro: pain control: PRN oxycodone 5-10 mg q3h , PRN dilaudid 0.2 - 0.4 mg q2h, and scheduled tylenol   CV: HDS   Pulm: incentive spirometry while awake  FEN/GI: reg diet, MIVF @ 125/hr  Endo: relatively euglycemic   : reveal POD 5; TOV POD 5. MARLY drains to remain until output decreases  Heme/ID: monitor for s/s of infection; monitor Hb and transfuse as indicated  Activity: Up as tolerated  Ambulate at least TID  PT/OT consulted   Ppx: SCDs, ambulation  Home RX on HOLD: gender hormones  Dispo: anticipate discharge to home pending recovery and reveal POD5  PT/OT recommendations: pending      Seen and examined with the chief resident. " "Will discuss with Joe Espinosa MD.    Miguel Angel Ruano MD, PGY-2  Urology Resident      PTA medications:   Prior to Admission medications    Medication Sig Start Date End Date Taking? Authorizing Provider   amitriptyline (ELAVIL) 25 MG tablet Take 1 tablet by mouth every evening 7/18/22  Yes Reported, Patient   escitalopram (LEXAPRO) 20 MG tablet Take 1.5 tablets by mouth every morning. 7/18/22  Yes Reported, Patient   estradiol (ESTRACE) 2 MG tablet Take 2 tablets by mouth 2 times daily 7/20/22  Yes Reported, Patient   hydrOXYzine (ATARAX) 50 MG tablet Take 100 mg by mouth every 6 hours as needed. 6/16/23  Yes Reported, Patient   medroxyPROGESTERone (PROVERA) 10 MG tablet Take 10 mg by mouth every morning. 7/18/22  Yes Reported, Patient   oxyCODONE (ROXICODONE) 5 MG tablet Take 1 tablet (5 mg) by mouth every 6 hours as needed for pain. 9/30/24 10/3/24 Yes Joe Espinosa MD          Contacting the urology team: Please see [facility] and page on-call clinician with any questions or concerns regarding this patient. Note writer may be unavailable.     To access Get Smart Content from intranet: under \"Applications\" --> \"Business Applications\" select \"Get Smart Content Smartweb\" and search \"Urology Adult & Pediatric/[facility].\" Please note that any question about a urology inpatient, [place] or [employer], should go to job code [id number].     "

## 2024-10-01 NOTE — PROGRESS NOTES
"   10/01/24 1145   Appointment Info   Signing Clinician's Name / Credentials (PT) Brooklynn Foster DPT   Living Environment   People in Home spouse   Current Living Arrangements house   Home Accessibility stairs to enter home   Number of Stairs, Main Entrance 3   Stair Railings, Main Entrance none   Transportation Anticipated family or friend will provide   Self-Care   Usual Activity Tolerance good   Current Activity Tolerance moderate   Regular Exercise No   Equipment Currently Used at Home cane, straight  (walking sticks)   Fall history within last six months no   Activity/Exercise/Self-Care Comment IND at baseline with all cares and mobility   General Information   Onset of Illness/Injury or Date of Surgery 09/30/24   Referring Physician Joe Espinosa MD   Pertinent History of Current Problem (include personal factors and/or comorbidities that impact the POC) per chart review, \"34 year old y/o adult POD#1 s/p full depth vaginoplasty\"   Existing Precautions/Restrictions   (avoid excessive ABD, penguin walk, prolonged sitting with donut)   Cognition   Affect/Mental Status (Cognition) WNL   Orientation Status (Cognition) oriented x 4   Follows Commands (Cognition) WNL   Pain Assessment   Patient Currently in Pain Yes, see Vital Sign flowsheet   Integumentary/Edema   Integumentary/Edema no deficits were identifed   Posture    Posture Forward head position;Protracted shoulders   Range of Motion (ROM)   Range of Motion ROM is WNL   Strength (Manual Muscle Testing)   Strength (Manual Muscle Testing) strength is WNL   Bed Mobility   Comment, (Bed Mobility) several attempts 2/2 body habitus but performs with supervision only after intial education for precautions   Transfers   Comment, (Transfers) SBA STS to FWW   Gait/Stairs (Locomotion)   Comment, (Gait/Stairs) pt ambulates x50' with FWW, Ute, steady, good adherence to short steps, mild dizziness from not being OOB yet   Balance   Balance no deficits were identified "   Sensory Examination   Sensory Perception patient reports no sensory changes   Clinical Impression   Criteria for Skilled Therapeutic Intervention Yes, treatment indicated   PT Diagnosis (PT) impaired functional mobility   Influenced by the following impairments post op precautions, pain   Functional limitations due to impairments gait, transfers, activity tolerance   Clinical Presentation (PT Evaluation Complexity) stable   Clinical Presentation Rationale PMH, clinician impression   Clinical Decision Making (Complexity) low complexity   Planned Therapy Interventions (PT) gait training;patient/family education;bed mobility training;progressive activity/exercise;risk factor education;home program guidelines   Risk & Benefits of therapy have been explained evaluation/treatment results reviewed;care plan/treatment goals reviewed;risks/benefits reviewed;current/potential barriers reviewed;participants voiced agreement with care plan;participants included;patient   Clinical Impression Comments motivated, good understanding for post surgical precautions   PT Total Evaluation Time   PT Eval, Low Complexity Minutes (47430) 8   Physical Therapy Goals   PT Frequency One time eval and treatment only   PT Predicted Duration/Target Date for Goal Attainment 10/01/24   PT Goals Gait;Transfers;Bed Mobility;PT Goal 1   PT: Transfers Independent;Sit to/from stand;Bed to/from chair;Assistive device;Within precautions;Goal Met   PT: Gait Independent;Greater than 200 feet;Within precautions;Goal Met   PT: Goal 1 pt will verbalize undestnding for post surgical restrictions and mobility implications  (goal met)   Interventions   Interventions Quick Adds Therapeutic Activity;Gait Training   Therapeutic Activity   Therapeutic Activities: dynamic activities to improve functional performance Minutes (20071) 10   Treatment Detail/Skilled Intervention pt educated on post srugical precatuions including avoiding excessive hip ABD with bed  mobilty, penguin walk, donut pillow for prolonged sitting. Intial vc for bed mobility technique but due to body habitus mild dificulty requiring multiple attempts to get to EOB but no safety concerns. Pt not OOB since surgery so progressive activity- intiially on 2L NC but with RN permissed removed and kept off monitors, pt asymptomatic on RA throughout session. Standing tolerance > small steps in place prior to gait training with good tolerance, SBA for safety as firs time OOB. After return to room from gait, encouraged up to chair as lunch arriving, set up with donut pillow, good balance with functional standing tasks. Educated on self-ambulation rest of admission which pt agreeable to. No further questions for PT, confident with mobility and activity for home.   Gait Training   Gait Training Minutes (98614) 9   Symptoms Noted During/After Treatment (Gait Training) fatigue   Treatment Detail/Skilled Intervention focus on penguin walk training and progressing away from FWW- pt ambulates with FWW SBA > Ute x200', steady gait, good adherence to short steps. she is agreeable to trial no AD- CGA > IND after x200' additional, iniitlaly mild path deviation but stabilzes with increased gait, vc for shorter steps to adhere to penguin walk as pt becoming more confident, no LOB throughout  session. Verbal discussion of stairs for home, pt denies concerns and able to demo SLS and lift LE adequate for stair accesss. PT educated on step to pattern to reduce hip flexion/abd which pt verbalies understnaing for.   PT Discharge Planning   PT Plan d/c from PT   PT Discharge Recommendation (DC Rec) home with assist   PT Rationale for DC Rec pt up IND, ambulating and mobilizing safely within surgical precautions, good family support at home   PT Brief overview of current status IND   Total Session Time   Timed Code Treatment Minutes 19   Total Session Time (sum of timed and untimed services) 27

## 2024-10-02 LAB
ANION GAP SERPL CALCULATED.3IONS-SCNC: 8 MMOL/L (ref 7–15)
BUN SERPL-MCNC: 5.4 MG/DL (ref 6–20)
CALCIUM SERPL-MCNC: 8.2 MG/DL (ref 8.8–10.4)
CHLORIDE SERPL-SCNC: 103 MMOL/L (ref 98–107)
CREAT SERPL-MCNC: 0.75 MG/DL (ref 0.51–1.17)
EGFRCR SERPLBLD CKD-EPI 2021: >90 ML/MIN/1.73M2
ERYTHROCYTE [DISTWIDTH] IN BLOOD BY AUTOMATED COUNT: 13.1 % (ref 10–15)
GLUCOSE SERPL-MCNC: 96 MG/DL (ref 70–99)
HCO3 SERPL-SCNC: 26 MMOL/L (ref 22–29)
HCT VFR BLD AUTO: 32.7 % (ref 35–53)
HGB BLD-MCNC: 10.9 G/DL (ref 11.7–17.7)
MCH RBC QN AUTO: 31.7 PG (ref 26.5–33)
MCHC RBC AUTO-ENTMCNC: 33.3 G/DL (ref 31.5–36.5)
MCV RBC AUTO: 95 FL (ref 78–100)
PLATELET # BLD AUTO: 218 10E3/UL (ref 150–450)
POTASSIUM SERPL-SCNC: 3.3 MMOL/L (ref 3.4–5.3)
RBC # BLD AUTO: 3.44 10E6/UL (ref 3.8–5.9)
SODIUM SERPL-SCNC: 137 MMOL/L (ref 135–145)
WBC # BLD AUTO: 13.2 10E3/UL (ref 4–11)

## 2024-10-02 PROCEDURE — 250N000013 HC RX MED GY IP 250 OP 250 PS 637: Performed by: STUDENT IN AN ORGANIZED HEALTH CARE EDUCATION/TRAINING PROGRAM

## 2024-10-02 PROCEDURE — 80048 BASIC METABOLIC PNL TOTAL CA: CPT | Performed by: STUDENT IN AN ORGANIZED HEALTH CARE EDUCATION/TRAINING PROGRAM

## 2024-10-02 PROCEDURE — 258N000003 HC RX IP 258 OP 636: Performed by: STUDENT IN AN ORGANIZED HEALTH CARE EDUCATION/TRAINING PROGRAM

## 2024-10-02 PROCEDURE — 36415 COLL VENOUS BLD VENIPUNCTURE: CPT | Performed by: STUDENT IN AN ORGANIZED HEALTH CARE EDUCATION/TRAINING PROGRAM

## 2024-10-02 PROCEDURE — 250N000013 HC RX MED GY IP 250 OP 250 PS 637: Performed by: UROLOGY

## 2024-10-02 PROCEDURE — 250N000011 HC RX IP 250 OP 636: Performed by: STUDENT IN AN ORGANIZED HEALTH CARE EDUCATION/TRAINING PROGRAM

## 2024-10-02 PROCEDURE — 85014 HEMATOCRIT: CPT | Performed by: STUDENT IN AN ORGANIZED HEALTH CARE EDUCATION/TRAINING PROGRAM

## 2024-10-02 PROCEDURE — 120N000002 HC R&B MED SURG/OB UMMC

## 2024-10-02 RX ORDER — CALCIUM CARBONATE 500 MG/1
500 TABLET, CHEWABLE ORAL DAILY PRN
Status: DISCONTINUED | OUTPATIENT
Start: 2024-10-02 | End: 2024-10-05 | Stop reason: HOSPADM

## 2024-10-02 RX ORDER — SIMETHICONE 80 MG
80 TABLET,CHEWABLE ORAL EVERY 6 HOURS PRN
Status: DISCONTINUED | OUTPATIENT
Start: 2024-10-02 | End: 2024-10-05 | Stop reason: HOSPADM

## 2024-10-02 RX ORDER — POTASSIUM CHLORIDE 1500 MG/1
40 TABLET, EXTENDED RELEASE ORAL ONCE
Status: COMPLETED | OUTPATIENT
Start: 2024-10-02 | End: 2024-10-02

## 2024-10-02 RX ADMIN — CEFAZOLIN 3 G: 10 INJECTION, POWDER, FOR SOLUTION INTRAVENOUS at 04:06

## 2024-10-02 RX ADMIN — ENOXAPARIN SODIUM 40 MG: 40 INJECTION SUBCUTANEOUS at 08:13

## 2024-10-02 RX ADMIN — POTASSIUM CHLORIDE 40 MEQ: 1500 TABLET, EXTENDED RELEASE ORAL at 23:21

## 2024-10-02 RX ADMIN — ACETAMINOPHEN 975 MG: 325 TABLET ORAL at 14:16

## 2024-10-02 RX ADMIN — ENOXAPARIN SODIUM 40 MG: 40 INJECTION SUBCUTANEOUS at 19:53

## 2024-10-02 RX ADMIN — ACETAMINOPHEN 975 MG: 325 TABLET ORAL at 06:22

## 2024-10-02 RX ADMIN — OXYCODONE HYDROCHLORIDE 5 MG: 5 TABLET ORAL at 19:56

## 2024-10-02 RX ADMIN — ESCITALOPRAM OXALATE 30 MG: 10 TABLET ORAL at 08:13

## 2024-10-02 RX ADMIN — TOLTERODINE 4 MG: 4 CAPSULE, EXTENDED RELEASE ORAL at 08:13

## 2024-10-02 RX ADMIN — AMITRIPTYLINE HYDROCHLORIDE 25 MG: 25 TABLET, FILM COATED ORAL at 19:53

## 2024-10-02 RX ADMIN — ACETAMINOPHEN 975 MG: 325 TABLET ORAL at 22:12

## 2024-10-02 RX ADMIN — CEFAZOLIN 3 G: 10 INJECTION, POWDER, FOR SOLUTION INTRAVENOUS at 12:05

## 2024-10-02 RX ADMIN — SENNOSIDES AND DOCUSATE SODIUM 1 TABLET: 50; 8.6 TABLET ORAL at 19:53

## 2024-10-02 RX ADMIN — CEFAZOLIN 3 G: 10 INJECTION, POWDER, FOR SOLUTION INTRAVENOUS at 19:56

## 2024-10-02 RX ADMIN — SENNOSIDES AND DOCUSATE SODIUM 1 TABLET: 50; 8.6 TABLET ORAL at 08:13

## 2024-10-02 RX ADMIN — POLYETHYLENE GLYCOL 3350 17 G: 17 POWDER, FOR SOLUTION ORAL at 08:13

## 2024-10-02 ASSESSMENT — ACTIVITIES OF DAILY LIVING (ADL)
ADLS_ACUITY_SCORE: 27
ADLS_ACUITY_SCORE: 28
ADLS_ACUITY_SCORE: 27
ADLS_ACUITY_SCORE: 28
ADLS_ACUITY_SCORE: 27
ADLS_ACUITY_SCORE: 28
ADLS_ACUITY_SCORE: 27
ADLS_ACUITY_SCORE: 28
ADLS_ACUITY_SCORE: 27
ADLS_ACUITY_SCORE: 27

## 2024-10-02 NOTE — PROGRESS NOTES
"Urology  Progress Note    The author of this note may not be on call. To avoid delays in patient care please use [employer] to find correct on call person    24 hour events/Subjective:     - No acute events overnight   - Pain well controlled on current regimen    - Tolerating regular diet ; no nausea or vomiting   - Passing flatus, no bowel movement   - Not yet ambulating.      Exam  /71 (BP Location: Left arm)   Pulse 109   Temp 100.3  F (37.9  C) (Oral)   Resp 18   Ht 1.702 m (5' 7.01\")   Wt 145.8 kg (321 lb 6.9 oz)   SpO2 93%   BMI 50.33 kg/m    No acute distress  Unlabored breathing on RA  Abdomen soft, appropriately tender, nondistended. Incisions cdi  Bolster in place, moderately saturated  Lakhani draining clear yellow urine      Intake/Output Summary (Last 24 hours) at 10/1/2024 0832  Last data filed at 10/1/2024 0647  Gross per 24 hour   Intake 3134 ml   Output 4165 ml   Net -1031 ml       UOP 2700/-  L marly 20/-  R marly 40/-    Labs  Recent Labs   Lab Test 10/02/24  0537 10/01/24  0655 09/30/24  1501 09/30/24  1408 09/16/24  1448   WBC 13.2* 17.0*  --   --  11.3*   HGB 10.9* 11.7  --  13.2 14.1   CR  --  0.78 0.85  --  0.84          Assessment/Plan  34 year old y/o adult POD#2 s/p full depth vaginoplasty    Note - plan changes for today are in bold below.    Neuro: pain control: PRN oxycodone 5-10 mg q3h , PRN dilaudid 0.2 - 0.4 mg q2h, and scheduled tylenol   CV: HDS   Pulm: incentive spirometry while awake  FEN/GI: reg diet, MIVF @ 125/hr  Endo: relatively euglycemic   : reveal POD 5; TOV POD 5. MARLY drains to remain until output decreases  Heme/ID: monitor for s/s of infection; monitor Hb and transfuse as indicated  Activity: Up as tolerated  Ambulate at least TID  PT/OT consulted   Ppx: SCDs, ambulation  Home RX on HOLD: gender hormones  Dispo: anticipate discharge to home pending recovery and reveal POD5  PT/OT recommendations: pending      Seen and examined with the chief resident. Will discuss " "with Joe Espinosa MD.    Miguel Angel Ruano MD, PGY-3  Urology Resident      PTA medications:   Prior to Admission medications    Medication Sig Start Date End Date Taking? Authorizing Provider   amitriptyline (ELAVIL) 25 MG tablet Take 1 tablet by mouth every evening 7/18/22  Yes Reported, Patient   escitalopram (LEXAPRO) 20 MG tablet Take 1.5 tablets by mouth every morning. 7/18/22  Yes Reported, Patient   estradiol (ESTRACE) 2 MG tablet Take 2 tablets by mouth 2 times daily 7/20/22  Yes Reported, Patient   hydrOXYzine (ATARAX) 50 MG tablet Take 100 mg by mouth every 6 hours as needed. 6/16/23  Yes Reported, Patient   medroxyPROGESTERone (PROVERA) 10 MG tablet Take 10 mg by mouth every morning. 7/18/22  Yes Reported, Patient   oxyCODONE (ROXICODONE) 5 MG tablet Take 1 tablet (5 mg) by mouth every 6 hours as needed for pain. 9/30/24 10/3/24 Yes Joe Espinosa MD          Contacting the urology team: Please see [facility] and page on-call clinician with any questions or concerns regarding this patient. Note writer may be unavailable.     To access [employer] from intranet: under \"Applications\" --> \"Business Applications\" select \"[employer]\" and search \"Urology Adult & Pediatric/[facility].\" Please note that any question about a urology inpatient, [place] or [place], should go to job code [id number].     "

## 2024-10-02 NOTE — PLAN OF CARE
Problem: Suicide Risk  Goal: Absence of Self-Harm  Outcome: Progressing  Intervention: Assess Risk to Self and Maintain Safety  Recent Flowsheet Documentation  Taken 10/1/2024 1550 by Olga Wang RN  Enhanced Safety Measures: room near unit station   Goal Outcome Evaluation:      Plan of Care Reviewed With: patient    Overall Patient Progress: improvingOverall Patient Progress: improving    Outcome Evaluation: Pt able to ambulate safelt independently with walker. melendez patent NICOLE drains to suction bright red drainage. Pt had some swelling in right hand IV fluids stoped per provider. writer adminestered iv abx in foot iv. swelling continues to decrease throughout shift. patient is having normal bleeding picture placed in chart per provider. continue to monitor VS patient stated she felt warm temp taken at 1941 a febrile.

## 2024-10-02 NOTE — PLAN OF CARE
"Goal Outcome Evaluation:      Plan of Care Reviewed With: patient  Shift: 3915-3775    VS: /65 (BP Location: Right arm)   Pulse 78   Temp 98.1  F (36.7  C) (Oral)   Resp 17   Ht 1.702 m (5' 7.01\")   Wt 145.8 kg (321 lb 6.9 oz)   SpO2 96%   BMI 50.33 kg/m        Pain:  Has prn pain meds available  Neuro: A&Ox4  Resp: WDL   Diet: Reg diet  Skin: WDL   LDA: L Foot infusing NS @125mL/hr  Activity: Ind  Output: Voids adequately without difficulty   Additional Info/shift updates: [facility] 10/02  Call light within reach     Plan of care ongoing       "

## 2024-10-02 NOTE — PLAN OF CARE
[id number]    Please refer to flowsheet for full patient assessment and MAR for all medications administered during shift.        Pt A&Ox4, VSS, Fall precautions maintained with bed alarm on, bed locked and in lowest position. Pain managed by scheduled meds. POC discussed, questions encouraged and answered. No acute events during shift. Frequent round and checks done. POC continues.

## 2024-10-03 LAB
ANION GAP SERPL CALCULATED.3IONS-SCNC: 8 MMOL/L (ref 7–15)
BUN SERPL-MCNC: 5.6 MG/DL (ref 6–20)
CALCIUM SERPL-MCNC: 8.4 MG/DL (ref 8.8–10.4)
CHLORIDE SERPL-SCNC: 105 MMOL/L (ref 98–107)
CREAT SERPL-MCNC: 0.76 MG/DL (ref 0.51–1.17)
EGFRCR SERPLBLD CKD-EPI 2021: >90 ML/MIN/1.73M2
ERYTHROCYTE [DISTWIDTH] IN BLOOD BY AUTOMATED COUNT: 13 % (ref 10–15)
GLUCOSE SERPL-MCNC: 95 MG/DL (ref 70–99)
HCO3 SERPL-SCNC: 28 MMOL/L (ref 22–29)
HCT VFR BLD AUTO: 32.1 % (ref 35–53)
HGB BLD-MCNC: 10.7 G/DL (ref 11.7–17.7)
MCH RBC QN AUTO: 31 PG (ref 26.5–33)
MCHC RBC AUTO-ENTMCNC: 33.3 G/DL (ref 31.5–36.5)
MCV RBC AUTO: 93 FL (ref 78–100)
PATH REPORT.COMMENTS IMP SPEC: NORMAL
PATH REPORT.COMMENTS IMP SPEC: NORMAL
PATH REPORT.FINAL DX SPEC: NORMAL
PATH REPORT.GROSS SPEC: NORMAL
PATH REPORT.RELEVANT HX SPEC: NORMAL
PHOTO IMAGE: NORMAL
PLATELET # BLD AUTO: 215 10E3/UL (ref 150–450)
POTASSIUM SERPL-SCNC: 4.9 MMOL/L (ref 3.4–5.3)
POTASSIUM SERPL-SCNC: 4.9 MMOL/L (ref 3.4–5.3)
RBC # BLD AUTO: 3.45 10E6/UL (ref 3.8–5.9)
SODIUM SERPL-SCNC: 141 MMOL/L (ref 135–145)
WBC # BLD AUTO: 11.1 10E3/UL (ref 4–11)

## 2024-10-03 PROCEDURE — 80048 BASIC METABOLIC PNL TOTAL CA: CPT | Performed by: STUDENT IN AN ORGANIZED HEALTH CARE EDUCATION/TRAINING PROGRAM

## 2024-10-03 PROCEDURE — 120N000002 HC R&B MED SURG/OB UMMC

## 2024-10-03 PROCEDURE — 85027 COMPLETE CBC AUTOMATED: CPT | Performed by: STUDENT IN AN ORGANIZED HEALTH CARE EDUCATION/TRAINING PROGRAM

## 2024-10-03 PROCEDURE — 258N000003 HC RX IP 258 OP 636: Performed by: STUDENT IN AN ORGANIZED HEALTH CARE EDUCATION/TRAINING PROGRAM

## 2024-10-03 PROCEDURE — 36415 COLL VENOUS BLD VENIPUNCTURE: CPT | Performed by: STUDENT IN AN ORGANIZED HEALTH CARE EDUCATION/TRAINING PROGRAM

## 2024-10-03 PROCEDURE — 250N000013 HC RX MED GY IP 250 OP 250 PS 637: Performed by: STUDENT IN AN ORGANIZED HEALTH CARE EDUCATION/TRAINING PROGRAM

## 2024-10-03 PROCEDURE — 250N000011 HC RX IP 250 OP 636: Performed by: STUDENT IN AN ORGANIZED HEALTH CARE EDUCATION/TRAINING PROGRAM

## 2024-10-03 RX ADMIN — TOLTERODINE 4 MG: 4 CAPSULE, EXTENDED RELEASE ORAL at 07:46

## 2024-10-03 RX ADMIN — CEFAZOLIN 3 G: 10 INJECTION, POWDER, FOR SOLUTION INTRAVENOUS at 20:20

## 2024-10-03 RX ADMIN — ENOXAPARIN SODIUM 40 MG: 40 INJECTION SUBCUTANEOUS at 20:20

## 2024-10-03 RX ADMIN — SENNOSIDES AND DOCUSATE SODIUM 1 TABLET: 50; 8.6 TABLET ORAL at 20:20

## 2024-10-03 RX ADMIN — ENOXAPARIN SODIUM 40 MG: 40 INJECTION SUBCUTANEOUS at 07:46

## 2024-10-03 RX ADMIN — POLYETHYLENE GLYCOL 3350 17 G: 17 POWDER, FOR SOLUTION ORAL at 07:46

## 2024-10-03 RX ADMIN — SENNOSIDES AND DOCUSATE SODIUM 1 TABLET: 50; 8.6 TABLET ORAL at 07:46

## 2024-10-03 RX ADMIN — ACETAMINOPHEN 975 MG: 325 TABLET ORAL at 05:47

## 2024-10-03 RX ADMIN — SODIUM CHLORIDE: 9 INJECTION, SOLUTION INTRAVENOUS at 05:56

## 2024-10-03 RX ADMIN — ESCITALOPRAM OXALATE 30 MG: 10 TABLET ORAL at 07:46

## 2024-10-03 RX ADMIN — CEFAZOLIN 3 G: 10 INJECTION, POWDER, FOR SOLUTION INTRAVENOUS at 04:06

## 2024-10-03 RX ADMIN — AMITRIPTYLINE HYDROCHLORIDE 25 MG: 25 TABLET, FILM COATED ORAL at 20:20

## 2024-10-03 RX ADMIN — CEFAZOLIN 3 G: 10 INJECTION, POWDER, FOR SOLUTION INTRAVENOUS at 11:29

## 2024-10-03 ASSESSMENT — ACTIVITIES OF DAILY LIVING (ADL)
ADLS_ACUITY_SCORE: 28

## 2024-10-03 NOTE — PROGRESS NOTES
"0850-4214    VS: /75 (BP Location: Right arm, Patient Position: Semi-Shaw's, Cuff Size: Adult Large)   Pulse 92   Temp 99.2  F (37.3  C) (Oral)   Resp 18   Ht 1.702 m (5' 7.01\")   Wt 145.8 kg (321 lb 6.9 oz)   SpO2 94%   BMI 50.33 kg/m      O2: Room air > 90   Output: Continent of bowel and bladder, voids spontaneously w/o difficulty   Last BM: 10/0 per patient report   Activity: ASSIST of 1 with walker   Skin: X    Pain: Schedule med given              CMS: A&Ox***, denies chest pain, n/v, numbness/tingling, and dizziness   Dressing: none   Diet: REgular   LDA: RLPIV infusing    Equipment: Personal belongings   Plan: Call light in reach, continue POC   Additional Info:         "

## 2024-10-03 NOTE — PLAN OF CARE
"Goal Outcome Evaluation:      Plan of Care Reviewed With: patient    Overall Patient Progress: improvingOverall Patient Progress: improving       Shift: 0321-9902    VS: /52 (BP Location: Right arm)   Pulse 93   Temp 98.7  F (37.1  C) (Oral)   Resp 18   Ht 1.702 m (5' 7.01\")   Wt 145.8 kg (321 lb 6.9 oz)   SpO2 93%   BMI 50.33 kg/m        Pain:  Denies pain this shift  Neuro: A&Ox4   Resp: WDL   Diet: Reg diet  Skin: No new deficits noted. Has abdominal incisions open to air   LDA: L foot PIV infusing NS @125/hr. 2 NICOLE drains with minimal output. Lakhani cath in place draining adequately.    Activity: Ind. Pt not oob this shift, stated she did not want to yet.   Output: Voids adequately without difficulty. LBM 10/02  Additional Info/shift updates: On potassium replacement protocol, result WNL  Call light within reach     Plan of care ongoing     "

## 2024-10-03 NOTE — PROGRESS NOTES
"Urology  Progress Note    The author of this note may not be on call. To avoid delays in patient care please use [facility] to find correct on call person    24 hour events/Subjective:     - No acute events overnight   - Pain well controlled on current regimen    - Tolerating regular diet ; no nausea or vomiting   - Passing flatus, no bowel movement   - Not yet ambulating.      Exam  /65 (BP Location: Right arm)   Pulse 78   Temp 98.1  F (36.7  C) (Oral)   Resp 17   Ht 1.702 m (5' 7.01\")   Wt 145.8 kg (321 lb 6.9 oz)   SpO2 96%   BMI 50.33 kg/m    No acute distress  Unlabored breathing on RA  Abdomen soft, appropriately tender, nondistended. Incisions cdi  Bolster in place, moderately saturated  Lakhani draining clear yellow urine      Intake/Output Summary (Last 24 hours) at 10/1/2024 0832  Last data filed at 10/1/2024 0647  Gross per 24 hour   Intake 3134 ml   Output 4165 ml   Net -1031 ml       UOP 3900/-  L marly 5/-  R marly 5/-    Labs  Recent Labs   Lab Test 10/03/24  0401 10/02/24  0537 10/01/24  0655   WBC 11.1* 13.2* 17.0*   HGB 10.7* 10.9* 11.7   CR 0.76 0.75 0.78          Assessment/Plan  34 year old y/o adult POD#3 s/p full depth vaginoplasty    Note - plan changes for today are in bold below.    Neuro: pain control: PRN oxycodone 5-10 mg q3h , PRN dilaudid 0.2 - 0.4 mg q2h, and scheduled tylenol   CV: HDS   Pulm: incentive spirometry while awake  FEN/GI: reg diet, MIVF @ 125/hr  Endo: relatively euglycemic   : reveal POD 5; TOV POD 5. MARLY drains to remain until output decreases  Heme/ID: monitor for s/s of infection; monitor Hb and transfuse as indicated  Activity: Up as tolerated  Ambulate at least TID  PT/OT consulted   Ppx: SCDs, ambulation  Home RX on HOLD: gender hormones  Dispo: anticipate discharge to home pending recovery and reveal POD5  PT/OT recommendations: pending      Seen and examined with the chief resident. Will discuss with Joe Espinosa MD.    Miguel Angel Ruano MD, " "PGY-3  Urology Resident      PTA medications:   Prior to Admission medications    Medication Sig Start Date End Date Taking? Authorizing Provider   amitriptyline (ELAVIL) 25 MG tablet Take 1 tablet by mouth every evening 7/18/22  Yes Reported, Patient   escitalopram (LEXAPRO) 20 MG tablet Take 1.5 tablets by mouth every morning. 7/18/22  Yes Reported, Patient   estradiol (ESTRACE) 2 MG tablet Take 2 tablets by mouth 2 times daily 7/20/22  Yes Reported, Patient   hydrOXYzine (ATARAX) 50 MG tablet Take 100 mg by mouth every 6 hours as needed. 6/16/23  Yes Reported, Patient   medroxyPROGESTERone (PROVERA) 10 MG tablet Take 10 mg by mouth every morning. 7/18/22  Yes Reported, Patient   oxyCODONE (ROXICODONE) 5 MG tablet Take 1 tablet (5 mg) by mouth every 6 hours as needed for pain. 9/30/24 10/3/24 Yes Joe Espinosa MD          Contacting the urology team: Please see [facility] and page on-call clinician with any questions or concerns regarding this patient. Note writer may be unavailable.     To access [employer] from intranet: under \"Applications\" --> \"Business Applications\" select \"[employer]\" and search \"Urology Adult & Pediatric/[facility].\" Please note that any question about a urology inpatient, [place] or [place], should go to job code [id number].     "

## 2024-10-04 LAB
ANION GAP SERPL CALCULATED.3IONS-SCNC: 10 MMOL/L (ref 7–15)
BUN SERPL-MCNC: 6 MG/DL (ref 6–20)
CALCIUM SERPL-MCNC: 8.5 MG/DL (ref 8.8–10.4)
CHLORIDE SERPL-SCNC: 101 MMOL/L (ref 98–107)
CREAT SERPL-MCNC: 0.72 MG/DL (ref 0.51–1.17)
EGFRCR SERPLBLD CKD-EPI 2021: >90 ML/MIN/1.73M2
ERYTHROCYTE [DISTWIDTH] IN BLOOD BY AUTOMATED COUNT: 12.8 % (ref 10–15)
GLUCOSE SERPL-MCNC: 89 MG/DL (ref 70–99)
HCO3 SERPL-SCNC: 26 MMOL/L (ref 22–29)
HCT VFR BLD AUTO: 32.5 % (ref 35–53)
HGB BLD-MCNC: 10.9 G/DL (ref 11.7–17.7)
MCH RBC QN AUTO: 31.5 PG (ref 26.5–33)
MCHC RBC AUTO-ENTMCNC: 33.5 G/DL (ref 31.5–36.5)
MCV RBC AUTO: 94 FL (ref 78–100)
PLATELET # BLD AUTO: 260 10E3/UL (ref 150–450)
POTASSIUM SERPL-SCNC: 3.3 MMOL/L (ref 3.4–5.3)
POTASSIUM SERPL-SCNC: 4.6 MMOL/L (ref 3.4–5.3)
RBC # BLD AUTO: 3.46 10E6/UL (ref 3.8–5.9)
SODIUM SERPL-SCNC: 137 MMOL/L (ref 135–145)
WBC # BLD AUTO: 11.4 10E3/UL (ref 4–11)

## 2024-10-04 PROCEDURE — 36415 COLL VENOUS BLD VENIPUNCTURE: CPT | Performed by: STUDENT IN AN ORGANIZED HEALTH CARE EDUCATION/TRAINING PROGRAM

## 2024-10-04 PROCEDURE — 85027 COMPLETE CBC AUTOMATED: CPT | Performed by: STUDENT IN AN ORGANIZED HEALTH CARE EDUCATION/TRAINING PROGRAM

## 2024-10-04 PROCEDURE — 250N000011 HC RX IP 250 OP 636: Performed by: STUDENT IN AN ORGANIZED HEALTH CARE EDUCATION/TRAINING PROGRAM

## 2024-10-04 PROCEDURE — 85048 AUTOMATED LEUKOCYTE COUNT: CPT | Performed by: STUDENT IN AN ORGANIZED HEALTH CARE EDUCATION/TRAINING PROGRAM

## 2024-10-04 PROCEDURE — 80048 BASIC METABOLIC PNL TOTAL CA: CPT | Performed by: STUDENT IN AN ORGANIZED HEALTH CARE EDUCATION/TRAINING PROGRAM

## 2024-10-04 PROCEDURE — 250N000013 HC RX MED GY IP 250 OP 250 PS 637: Performed by: STUDENT IN AN ORGANIZED HEALTH CARE EDUCATION/TRAINING PROGRAM

## 2024-10-04 PROCEDURE — 250N000013 HC RX MED GY IP 250 OP 250 PS 637: Performed by: UROLOGY

## 2024-10-04 PROCEDURE — 36415 COLL VENOUS BLD VENIPUNCTURE: CPT | Performed by: UROLOGY

## 2024-10-04 PROCEDURE — 120N000002 HC R&B MED SURG/OB UMMC

## 2024-10-04 PROCEDURE — 84132 ASSAY OF SERUM POTASSIUM: CPT | Performed by: UROLOGY

## 2024-10-04 PROCEDURE — 258N000003 HC RX IP 258 OP 636: Performed by: STUDENT IN AN ORGANIZED HEALTH CARE EDUCATION/TRAINING PROGRAM

## 2024-10-04 RX ORDER — POTASSIUM CHLORIDE 1500 MG/1
40 TABLET, EXTENDED RELEASE ORAL ONCE
Status: COMPLETED | OUTPATIENT
Start: 2024-10-04 | End: 2024-10-04

## 2024-10-04 RX ADMIN — TOLTERODINE 4 MG: 4 CAPSULE, EXTENDED RELEASE ORAL at 09:01

## 2024-10-04 RX ADMIN — SENNOSIDES AND DOCUSATE SODIUM 1 TABLET: 50; 8.6 TABLET ORAL at 09:02

## 2024-10-04 RX ADMIN — ESCITALOPRAM OXALATE 30 MG: 10 TABLET ORAL at 09:02

## 2024-10-04 RX ADMIN — ACETAMINOPHEN 650 MG: 325 TABLET ORAL at 20:20

## 2024-10-04 RX ADMIN — CEFAZOLIN 3 G: 10 INJECTION, POWDER, FOR SOLUTION INTRAVENOUS at 20:05

## 2024-10-04 RX ADMIN — ACETAMINOPHEN 650 MG: 325 TABLET ORAL at 05:00

## 2024-10-04 RX ADMIN — POTASSIUM CHLORIDE 40 MEQ: 1500 TABLET, EXTENDED RELEASE ORAL at 11:29

## 2024-10-04 RX ADMIN — SODIUM CHLORIDE: 9 INJECTION, SOLUTION INTRAVENOUS at 06:45

## 2024-10-04 RX ADMIN — CEFAZOLIN 3 G: 10 INJECTION, POWDER, FOR SOLUTION INTRAVENOUS at 11:43

## 2024-10-04 RX ADMIN — ENOXAPARIN SODIUM 40 MG: 40 INJECTION SUBCUTANEOUS at 09:01

## 2024-10-04 RX ADMIN — ENOXAPARIN SODIUM 40 MG: 40 INJECTION SUBCUTANEOUS at 20:03

## 2024-10-04 RX ADMIN — HYDROXYZINE HYDROCHLORIDE 100 MG: 50 TABLET, FILM COATED ORAL at 13:31

## 2024-10-04 RX ADMIN — POLYETHYLENE GLYCOL 3350 17 G: 17 POWDER, FOR SOLUTION ORAL at 09:01

## 2024-10-04 RX ADMIN — AMITRIPTYLINE HYDROCHLORIDE 25 MG: 25 TABLET, FILM COATED ORAL at 20:03

## 2024-10-04 RX ADMIN — CEFAZOLIN 3 G: 10 INJECTION, POWDER, FOR SOLUTION INTRAVENOUS at 04:48

## 2024-10-04 RX ADMIN — SENNOSIDES AND DOCUSATE SODIUM 1 TABLET: 50; 8.6 TABLET ORAL at 20:03

## 2024-10-04 ASSESSMENT — ACTIVITIES OF DAILY LIVING (ADL)
ADLS_ACUITY_SCORE: 28
ADLS_ACUITY_SCORE: 29
ADLS_ACUITY_SCORE: 28
ADLS_ACUITY_SCORE: 28
ADLS_ACUITY_SCORE: 29
ADLS_ACUITY_SCORE: 28
ADLS_ACUITY_SCORE: 29
ADLS_ACUITY_SCORE: 29

## 2024-10-04 NOTE — PLAN OF CARE
[id number]    Goal Outcome Evaluation:      Plan of Care Reviewed With: patient    Overall Patient Progress: no changeOverall Patient Progress: no change    Outcome Evaluation: NO acute change this shft    Pt. is A & O X 4. Pleasant and cooperative. Vitals stable on room air. Denies SOB or acute distress. Pain is managed with PRN Tylenol. NS infusing continuously @ 125 ml/hr. Pt. Slept most of the night. On RN managed Potasium protocol. Last K+ level was 4.9. Call button placed within reach. Plan of care is ongoing.

## 2024-10-04 NOTE — PLAN OF CARE
"Goal Outcome Evaluation:         Goal Outcome Evaluation:       Plan of Care Reviewed With: patient     Overall Patient Progress: improvingOverall Patient Progress: improving     FOCUS/GOAL     Bowel management, Bladder management, Mobility, Skin integrity, and Safety management       VS: BP (!) 153/82 (BP Location: Right arm, Patient Position: Semi-Shaw's, Cuff Size: Adult Large)   Pulse 81   Temp 98.6  F (37  C) (Oral)   Resp 16   Ht 1.702 m (5' 7.01\")   Wt 145.8 kg (321 lb 6.9 oz)   SpO2 94%   BMI 50.33 kg/m       O2: 94% RA   Output: Adequate; melendez in place   Last BM: 10/4/2024   Activity: Independent SBA   Skin: 5 laparoscopic incisions EMILIA across abdomen; vaginoplasty packed dressing; melendez; 2 labial NICOLE drains to bulb suction   Pain: Managed with tylenol PRN Q4   CMS: Denies SOB, chest pain, headache, numbness or tingling   Dressing: Vaginoplasty packed dressing dried drainage-- to be removed by surgery 10/5 -- post OP day 5   Diet: Regular, thin, pills whole   LDA: L PIV SL   Equipment: IV pole personal belongings,melendez,2 NICOLE drains   Plan: Remove surgical dressing, NICOLE drains, and melendez 10/5 by surgery team   Additional Info: K+ in AM 3.3 replaced @ 1600 4.6 AM 10/5 labs ordered                       "

## 2024-10-04 NOTE — PROGRESS NOTES
"Urology  Progress Note    The author of this note may not be on call. To avoid delays in patient care please use [employer] to find correct on call person    24 hour events/Subjective:    - looks and feels great       Exam  BP (!) 153/82 (BP Location: Right arm, Patient Position: Semi-Shaw's, Cuff Size: Adult Large)   Pulse 75   Temp 99.5  F (37.5  C) (Oral)   Resp 16   Ht 1.702 m (5' 7.01\")   Wt 145.8 kg (321 lb 6.9 oz)   SpO2 94%   BMI 50.33 kg/m    No acute distress  Unlabored breathing on RA  Abdomen soft, appropriately tender, nondistended. Incisions cdi  Bolster in place, moderately saturated  Lakhani draining clear yellow urine      Intake/Output Summary (Last 24 hours) at 10/1/2024 0832  Last data filed at 10/1/2024 0647  Gross per 24 hour   Intake 3134 ml   Output 4165 ml   Net -1031 ml         Labs  Recent Labs   Lab Test 10/04/24  0702 10/03/24  0401 10/02/24  0537   WBC 11.4* 11.1* 13.2*   HGB 10.9* 10.7* 10.9*   CR 0.72 0.76 0.75          Assessment/Plan  34 year old y/o adult POD#4 s/p full depth vaginoplasty    Note - plan changes for today are in bold below.    Neuro: pain control: PRN oxycodone 5-10 mg q3h , PRN dilaudid 0.2 - 0.4 mg q2h, and scheduled tylenol   CV: HDS   Pulm: incentive spirometry while awake  FEN/GI: reg diet,discontinue IVF   Endo: relatively euglycemic   : reveal POD 5; TOV POD 5. NICOLE drains to remain until output decreases  Heme/ID: monitor for s/s of infection; monitor Hb and transfuse as indicated  Activity: Up as tolerated  Ambulate at least TID  PT/OT consulted   Ppx: SCDs, ambulation  Home RX on HOLD: gender hormones  Dispo: anticipate discharge to home pending recovery and reveal POD5  PT/OT recommendations: pending      Will discuss with Joe Espinosa MD.    Mik Singh MD   Urology Chief Resident, PGY-5       PTA medications:   Prior to Admission medications    Medication Sig Start Date End Date Taking? Authorizing Provider   amitriptyline (ELAVIL) 25 MG tablet " "Take 1 tablet by mouth every evening 7/18/22  Yes Reported, Patient   escitalopram (LEXAPRO) 20 MG tablet Take 1.5 tablets by mouth every morning. 7/18/22  Yes Reported, Patient   estradiol (ESTRACE) 2 MG tablet Take 2 tablets by mouth 2 times daily 7/20/22  Yes Reported, Patient   hydrOXYzine (ATARAX) 50 MG tablet Take 100 mg by mouth every 6 hours as needed. 6/16/23  Yes Reported, Patient   medroxyPROGESTERone (PROVERA) 10 MG tablet Take 10 mg by mouth every morning. 7/18/22  Yes Reported, Patient   oxyCODONE (ROXICODONE) 5 MG tablet Take 1 tablet (5 mg) by mouth every 6 hours as needed for pain. 9/30/24 10/3/24 Yes Joe Espinosa MD          Contacting the urology team: Please see [facility] and page on-call clinician with any questions or concerns regarding this patient. Note writer may be unavailable.     To access [employer] from intranet: under \"Applications\" --> \"Business Applications\" select \"[employer]\" and search \"Urology Adult & Pediatric/[facility].\" Please note that any question about a urology inpatient, [place] or [place], should go to job code [id number].     "

## 2024-10-05 VITALS
RESPIRATION RATE: 18 BRPM | WEIGHT: 293 LBS | SYSTOLIC BLOOD PRESSURE: 138 MMHG | HEART RATE: 92 BPM | BODY MASS INDEX: 45.99 KG/M2 | TEMPERATURE: 98.5 F | OXYGEN SATURATION: 94 % | DIASTOLIC BLOOD PRESSURE: 74 MMHG | HEIGHT: 67 IN

## 2024-10-05 LAB — POTASSIUM SERPL-SCNC: 3.8 MMOL/L (ref 3.4–5.3)

## 2024-10-05 PROCEDURE — 36415 COLL VENOUS BLD VENIPUNCTURE: CPT | Performed by: UROLOGY

## 2024-10-05 PROCEDURE — 250N000013 HC RX MED GY IP 250 OP 250 PS 637: Performed by: STUDENT IN AN ORGANIZED HEALTH CARE EDUCATION/TRAINING PROGRAM

## 2024-10-05 PROCEDURE — 250N000011 HC RX IP 250 OP 636: Performed by: STUDENT IN AN ORGANIZED HEALTH CARE EDUCATION/TRAINING PROGRAM

## 2024-10-05 PROCEDURE — 84132 ASSAY OF SERUM POTASSIUM: CPT | Performed by: UROLOGY

## 2024-10-05 PROCEDURE — 258N000003 HC RX IP 258 OP 636: Performed by: STUDENT IN AN ORGANIZED HEALTH CARE EDUCATION/TRAINING PROGRAM

## 2024-10-05 RX ORDER — AMOXICILLIN 250 MG
1 CAPSULE ORAL DAILY
Qty: 30 TABLET | Refills: 0 | Status: SHIPPED | OUTPATIENT
Start: 2024-10-05

## 2024-10-05 RX ADMIN — SENNOSIDES AND DOCUSATE SODIUM 1 TABLET: 50; 8.6 TABLET ORAL at 08:13

## 2024-10-05 RX ADMIN — ENOXAPARIN SODIUM 40 MG: 40 INJECTION SUBCUTANEOUS at 08:13

## 2024-10-05 RX ADMIN — POLYETHYLENE GLYCOL 3350 17 G: 17 POWDER, FOR SOLUTION ORAL at 08:14

## 2024-10-05 RX ADMIN — ESCITALOPRAM OXALATE 30 MG: 10 TABLET ORAL at 08:13

## 2024-10-05 RX ADMIN — CEFAZOLIN 3 G: 10 INJECTION, POWDER, FOR SOLUTION INTRAVENOUS at 04:14

## 2024-10-05 RX ADMIN — CEFAZOLIN 3 G: 10 INJECTION, POWDER, FOR SOLUTION INTRAVENOUS at 11:53

## 2024-10-05 ASSESSMENT — ACTIVITIES OF DAILY LIVING (ADL)
ADLS_ACUITY_SCORE: 22
ADLS_ACUITY_SCORE: 28
ADLS_ACUITY_SCORE: 28
ADLS_ACUITY_SCORE: 22
ADLS_ACUITY_SCORE: 28
ADLS_ACUITY_SCORE: 22
ADLS_ACUITY_SCORE: 28
ADLS_ACUITY_SCORE: 22

## 2024-10-05 NOTE — PLAN OF CARE
"Goal Outcome Evaluation:      Plan of Care Reviewed With: patient    Overall Patient Progress: no changeOverall Patient Progress: no change       VS: /81 (BP Location: Right arm)   Pulse 91   Temp 98.6  F (37  C) (Oral)   Resp 18   Ht 1.702 m (5' 7.01\")   Wt 145.8 kg (321 lb 6.9 oz)   SpO2 95%   BMI 50.33 kg/m       Output/Last BM: Lakhani cath, continent of BM    Activity: Independent in room    Skin/Dressing: Vaginoplasty packed dressing and NICOLE drain    Pain: No pain reported during shift    CMS: A&Ox4    Diet: Regular    LDA: L foot PIV SL    Equipment: IV pole personal belongings    Plan: Discharge home    Additional Info:        "

## 2024-10-05 NOTE — PROGRESS NOTES
6MS DISCHARGE    D: Patient discharged to home  at 2:30. Patient accompanied by friend.    I: Discharge prescriptions given to patient. All discharge medications and instructions reviewed with patient. Patient instructed to seek care if experiencing worsening symptoms, such as bleeding or pain. Other phone numbers to call with questions or concerns after discharge reviewed. Left foot PIV removed. Education completed.    A: Patient  verbalized understanding of discharge medications and instructions. Prescribed home medications given to patient.  All belongings returned to patient.

## 2024-10-05 NOTE — PLAN OF CARE
"0425-4812  Goal Outcome Evaluation:      Plan of Care Reviewed With: patient     Overall Patient Progress: no change     Outcome Evaluation:     VS: Blood pressure (!) 153/82, pulse 81, temperature 98.6  F (37  C), temperature source Oral, resp. rate 16, height 1.702 m (5' 7.01\"), weight 145.8 kg (321 lb 6.9 oz), SpO2 94%.     O2: SpO2 > 94%  and stable on RA. LS clear and equal bilaterally. Denies chest pain and SOB.    Output: Adequate ; melendez in place.    Last BM: Last BM 10/04/2024, denies abdominal discomfort. BS normoactive.    Activity:  Independent  / SBA    Skin: 5 laparoscopic abdominal incision open to air ; vaginoplasty packed dressing; melendez ; 2 labial NICOLE drains to bulb suction.   Pain: Pain managed with Tylenol PRN.   CMS: Intact, AOx4. Denies shortness of breath , headache,numbness and tingling.   Dressing: Vaginoplasty packed dressing dried to be removed 10/05; post op day 5.   Diet: Regular diet thin liquid. Denies nausea/vomiting.      LDA: L PIV SL.    Equipment: IV pole, personal belongings,    Plan: standard precautions maintained   10/05 to remove surgical dressing ,NICOLE drains by surgical team. and melendez    Additional Info: Potassium at 1600  4.6, 10/5 labs ordered.  Ambulates to the bathroom and to the [address] way at least four times during this shift.                 "

## 2024-10-05 NOTE — PLAN OF CARE
"  Problem: Adult Inpatient Plan of Care  Goal: Plan of Care Review  Description:  VS:  /74 (BP Location: Right arm)   Pulse 92   Temp 98.5  F (36.9  C) (Oral)   Resp 18   Ht 1.702 m (5' 7.01\")   Wt 145.8 kg (321 lb 6.9 oz)   SpO2 94%   BMI 50.33 kg/m      O2:  Room Air    Output:  Continent of Bowel / Lakhani Catheter    Last BM:  10/03/24   Activity:  Independent / SBA   Up for meals?  Yes    Skin:  Vaginoplasty Incision    Pain:  Denies    CMS:  A&OX 3-4   Dressing:  Yes    Diet:  Regular    LDA:  Left PIV / SL   Equipment:  Personal Belongings    Plan:  POC   Additional Info:  Gender Dysphoria    Riley Carrillo  Drains Suction     Lakhani Catheter Care completed 10/04    Bilateral Numbness Hands     Gauze Packing     No Urinary retention / Lakhani Catheter      Outcome: Progressing  Goal: Patient-Specific Goal (Individualized)  Description: Discharge to Home  Outcome: Progressing  Goal: Absence of Hospital-Acquired Illness or Injury  Outcome: Progressing  Intervention: Prevent Skin Injury  Recent Flowsheet Documentation  Taken 10/5/2024 0700 by Ruth Marquis, RN  Body Position: position changed independently  Goal: Optimal Comfort and Wellbeing  Outcome: Progressing  Goal: Readiness for Transition of Care  Outcome: Progressing       "

## 2024-10-05 NOTE — PLAN OF CARE
"Goal Outcome Evaluation:      Plan of Care Reviewed With: patient    Overall Patient Progress: no change    Outcome Evaluation: pt stable overnight. pt on IV abx. pt melendez intact and draining. the JPs has had no output this shift. pt denied pain through this shift.      Problem: Suicide Risk  Goal: Absence of Self-Harm  10/5/2024 0647 by Veronique Dawkins RN  Outcome: Progressing  10/5/2024 0447 by Veronique Dawkins RN  Outcome: Progressing  Intervention: Assess Risk to Self and Maintain Safety  Recent Flowsheet Documentation  Taken 10/5/2024 0400 by Veronique Dawkins RN  Enhanced Safety Measures:   pain management   room near unit station     Problem: Adult Inpatient Plan of Care  Goal: Plan of Care Review  Description: The Plan of Care Review/Shift note should be completed every shift.  The Outcome Evaluation is a brief statement about your assessment that the patient is improving, declining, or no change.  This information will be displayed automatically on your shift  note.  10/5/2024 0647 by Veronique Dawkins RN  Outcome: Progressing  Flowsheets (Taken 10/5/2024 0647)  Outcome Evaluation: pt stable overnight. pt on IV abx. pt melendez intact and draining. the JPs has had no output this shift. pt denied pain through this shift.  Plan of Care Reviewed With: patient  Overall Patient Progress: no change  10/5/2024 0447 by Veronique Dawkins RN  Outcome: Progressing  Flowsheets (Taken 10/5/2024 0447)  Plan of Care Reviewed With: patient  Overall Patient Progress: no change  Goal: Patient-Specific Goal (Individualized)  Description: You can add care plan individualizations to a care plan. Examples of Individualization might be:  \"Parent requests to be called daily at 9am for status\", \"I have a hard time hearing out of my right ear\", or \"Do not touch me to wake me up as it startles  me\".  10/5/2024 0647 by Veronique Dawkins RN  Outcome: Progressing  10/5/2024 0447 by Veronique Dawkins RN  Outcome: Progressing  Goal: Absence of " Hospital-Acquired Illness or Injury  10/5/2024 0647 by Veronique Dawkins RN  Outcome: Progressing  10/5/2024 0447 by Veronique Dawkins RN  Outcome: Progressing  Intervention: Identify and Manage Fall Risk  Recent Flowsheet Documentation  Taken 10/5/2024 0400 by Veronique Dawkins RN  Safety Promotion/Fall Prevention:   assistive device/personal items within reach   clutter free environment maintained   lighting adjusted   nonskid shoes/slippers when out of bed   room near nurse's station   safety round/check completed  Intervention: Prevent Skin Injury  Recent Flowsheet Documentation  Taken 10/5/2024 0400 by Veronique Dawkins RN  Body Position: position changed independently  Goal: Optimal Comfort and Wellbeing  10/5/2024 0647 by Veronique Dawkins RN  Outcome: Progressing  10/5/2024 0447 by Veronique Dawkins RN  Outcome: Progressing  Goal: Readiness for Transition of Care  10/5/2024 0647 by Veronique Dawkins RN  Outcome: Progressing  10/5/2024 0447 by Veronique Dawkins RN  Outcome: Progressing     Problem: Pain Acute  Goal: Optimal Pain Control and Function  10/5/2024 0647 by Veronique Dawkins RN  Outcome: Progressing  10/5/2024 0447 by Veronique Dawkins RN  Outcome: Progressing  Intervention: Prevent or Manage Pain  Recent Flowsheet Documentation  Taken 10/5/2024 0400 by Veronique Dawkins RN  Medication Review/Management: medications reviewed     Problem: Infection  Goal: Absence of Infection Signs and Symptoms  10/5/2024 0647 by Veronique Dawkins RN  Outcome: Progressing  10/5/2024 0447 by Veronique Dawkins RN  Outcome: Progressing     Problem: Fall Injury Risk  Goal: Absence of Fall and Fall-Related Injury  10/5/2024 0647 by Veronique Dawkins RN  Outcome: Progressing  10/5/2024 0447 by Veronique Dawkins RN  Outcome: Progressing  Intervention: Identify and Manage Contributors  Recent Flowsheet Documentation  Taken 10/5/2024 0400 by Veronique Dawkins RN  Medication Review/Management: medications reviewed  Intervention: Promote  Injury-Free Environment  Recent Flowsheet Documentation  Taken 10/5/2024 0400 by Veronique Dawkins, RN  Safety Promotion/Fall Prevention:   assistive device/personal items within reach   clutter free environment maintained   lighting adjusted   nonskid shoes/slippers when out of bed   room near nurse's station   safety round/check completed

## 2024-10-05 NOTE — PROGRESS NOTES
"Urology  Progress Note    The author of this note may not be on call. To avoid delays in patient care please use [facility] to find correct on call person    24 hour events/Subjective:    - NAEO  - OOB and ambulating  - Tolerating regular diet without nausea or vomiting  - Pain controlled on current regimen      Exam  /81 (BP Location: Right arm)   Pulse 91   Temp 98.6  F (37  C) (Oral)   Resp 18   Ht 1.702 m (5' 7.01\")   Wt 145.8 kg (321 lb 6.9 oz)   SpO2 95%   BMI 50.33 kg/m    No acute distress  Unlabored breathing on RA  Abdomen soft, appropriately tender, nondistended. Incisions cdi  Bolster in place, moderately saturated  Lakhani draining clear yellow urine      Intake/Output Summary  UOP 3600/NR  L Drain 0/NR  R Drain 0/NR  Stool 1x/NR    Labs  Recent Labs   Lab Test 10/04/24  0702 10/03/24  0401 10/02/24  0537   WBC 11.4* 11.1* 13.2*   HGB 10.9* 10.7* 10.9*   CR 0.72 0.76 0.75      Assessment/Plan  34 year old y/o adult POD#5 s/p full depth vaginoplasty    Note - plan changes for today are in bold below.    Neuro: pain control: PRN oxycodone 5-10 mg q3h , PRN dilaudid 0.2 - 0.4 mg q2h, and scheduled tylenol   CV: HDS   Pulm: incentive spirometry while awake  FEN/GI: reg diet  Endo: relatively euglycemic   : Reveal, TOV, and Drain removal today  Heme/ID: monitor for s/s of infection; monitor Hb and transfuse as indicated  Activity: Up as tolerated  Ambulate at least TID  PT/OT consulted   Ppx: SCDs, ambulation  Home RX on HOLD: gender hormones  Dispo: anticipate discharge to home pending recovery and reveal POD5      Will discuss with Joe Espinosa MD.    Charlette Camarillo MD   Urology Resident, PGY-2      PTA medications:   Prior to Admission medications    Medication Sig Start Date End Date Taking? Authorizing Provider   amitriptyline (ELAVIL) 25 MG tablet Take 1 tablet by mouth every evening 7/18/22  Yes Reported, Patient   escitalopram (LEXAPRO) 20 MG tablet Take 1.5 tablets by mouth every " "morning. 7/18/22  Yes Reported, Patient   estradiol (ESTRACE) 2 MG tablet Take 2 tablets by mouth 2 times daily 7/20/22  Yes Reported, Patient   hydrOXYzine (ATARAX) 50 MG tablet Take 100 mg by mouth every 6 hours as needed. 6/16/23  Yes Reported, Patient   medroxyPROGESTERone (PROVERA) 10 MG tablet Take 10 mg by mouth every morning. 7/18/22  Yes Reported, Patient   oxyCODONE (ROXICODONE) 5 MG tablet Take 1 tablet (5 mg) by mouth every 6 hours as needed for pain. 9/30/24 10/3/24 Yes Joe Espinosa MD          Contacting the urology team: Please see [facility] and page on-call clinician with any questions or concerns regarding this patient. Note writer may be unavailable.     To access [employer] from intranet: under \"Applications\" --> \"Business Applications\" select \"[employer]\" and search \"Urology Adult & Pediatric/[facility].\" Please note that any question about a urology inpatient, [place] or [place], should go to job code [id number].     "

## 2024-10-06 NOTE — DISCHARGE SUMMARY
[facility] UroDischarge Summary    Patient: Ria Leija    MRN: [id number]   : 1990         Date of Admission:  2024   Date of Discharge::  10/5/2024  2:31 PM  Admitting Physician:  Joe Espinosa MD  Discharge Physician:  Charlette Camarillo MD             Admission Diagnoses:   Gender dysphoria [F64.9]  Past Medical History:   Diagnosis Date    ADHD (attention deficit hyperactivity disorder)     Anxiety     Depression     Gender dysphoria     Migraine     Morbid obesity (H)              Discharge Diagnosis:     Gender dysphoria [F64.9]  Past Medical History:   Diagnosis Date    ADHD (attention deficit hyperactivity disorder)     Anxiety     Depression     Gender dysphoria     Migraine     Morbid obesity (H)      Hypokalemia  Acute blood loss anemia          Procedures:     Procedure(s): ID REVISE VAGINA FOR INTERSEC STATE [54931] (Robotic Full Depth Vaginoplasty. Possible Minimal Depth)  ID ABDOMINAL SURGERY PLACEMENT OF IUD [15116]               Medications Prior to Admission:     No medications prior to admission.             Discharge Medications:     Discharge Medication List as of 10/5/2024  2:21 PM        START taking these medications    Details   senna-docusate (SENOKOT-S/PERICOLACE) 8.6-50 MG tablet Take 1 tablet by mouth daily., Disp-30 tablet, R-0, E-Prescribe           CONTINUE these medications which have NOT CHANGED    Details   amitriptyline (ELAVIL) 25 MG tablet Take 1 tablet by mouth every evening, Historical      escitalopram (LEXAPRO) 20 MG tablet Take 1.5 tablets by mouth every morning., Historical      estradiol (ESTRACE) 2 MG tablet Take 2 tablets by mouth 2 times daily, Historical      hydrOXYzine (ATARAX) 50 MG tablet Take 100 mg by mouth every 6 hours as needed., Historical      medroxyPROGESTERone (PROVERA) 10 MG tablet Take 10 mg by mouth every morning., Historical           STOP taking these medications       oxyCODONE (ROXICODONE) 5 MG tablet Comments:   Reason for  "Stopping:         spironolactone (ALDACTONE) 100 MG tablet Comments:   Reason for Stopping:                     Consultations:   Consultation during this admission received:   NURSING TO CONSULT FOR VASCULAR ACCESS CARE IP CONSULT  PHYSICAL THERAPY ADULT IP CONSULT          Brief History of Illness:   Reason for admission requiring a surgical or invasive procedure:   Gender dysphoria [F64.9]   The patient underwent the following procedure(s):   See above   There were no immediate complications during this procedure.    Please refer to the full operative summary for details.           Hospital Course:   The patient's hospital course was unremarkable.  Ria Leija recovered as anticipated and experienced no post-operative complications.      On POD #5 she was ambulating without assistance, tolerating the discharge diet, had pain controlled with PO medications to go home with, and was requiring no IV medications or fluids. She was provded appropriate education of vaginal dilation. She was discharged to home with appropriate contact information, follow-up and instructions as seen below in the discharge paperwork.         Discharge Labs:     Recent Labs   Lab 10/04/24  0702 10/03/24  0401   WBC 11.4* 11.1*   HGB 10.9* 10.7*    215       Recent Labs   Lab 10/05/24  0811 10/04/24  1559 10/04/24  0702 10/03/24  0401   NA  --   --  137 141   POTASSIUM 3.8 4.6 3.3* 4.9  4.9   CHLORIDE  --   --  101 105   CO2  --   --  26 28   BUN  --   --  6.0 5.6*   CR  --   --  0.72 0.76   GLC  --   --  89 95   TARAS  --   --  8.5* 8.4*     No lab results found in last 7 days.    Invalid input(s): \"URINEBLOOD\"  No results found for this or any previous visit.         Discharge Instructions and Follow-Up:   Diet:   - Regular/ home diet     Activity:   - Walk with small/ shuffle steps. Walk with knees together. No long strides. Avoid strenuous exercise for 6 weeks.   - No lifting, pushing, pulling more than 10 pounds for 6 weeks. " Take care when pushing with your arms to stand up.   - Do not strain your belly area. When you bend, sit up or twice, you could strain the area around your incision.   - Avoid activities that put direct pressure on your vagina (ie. Bicycling, motorcycle, etc.)   - Do not strain with bowel movements.   - Do not drive until you can press the brake pedal quickly and fully without pain.   - Do not operate a motor vehicle while taking narcotic pain medications.     Vaginal Cares:   - Vaginal dilation per Dr. Espinosa's instructions which will at first be using the smallest dilator, three times per day and leaving it in for 15 minutes each time.   - Otherwise avoid sexual activity until fully healed (unless indicated by Dr. Espinosa)     Medications:   1) PAIN:   Oxycodone is a narcotic medication that has been prescribed for pain.   Narcotics will cause sleepiness and constipation and can become addictive, therefore it is best to stop or reduce them as soon as you can.   Any left over narcotics should be disposed of with an Authorized  for unneeded medications. Contact your [facility]'s or [facility] [employer]'s household trash and recycling service to learn about medication disposal options and guidelines for your area. If you decide to store this medication at home it should be kept in a locked cabinet to prevent access to children or visitors.   Never drive, operate machinery or drink alcoholic beverages while you are taking narcotic pain medications.   To reduce your narcotic use, take both Tylenol (acetaminophen 625mg) and ibuprofen (600mg) as directed. These have been prescribed for you. Do not take more than 4,000mg of Tylenol (acetaminophen/ APAP) from all sources in any 24 hour period since this can cause liver damage. Do not take more than 2400mg of ibuprofen in any 24 hour period since this can cause kidney damage.     2) CONSTIPATION: Pericolace (senna/docusate sodium) can be taken twice daily for prevention of  constipation since surgery, pain medications and bladder spasm medications can all make you constipated. Please reduce or stop pericolace if you develop loose stools. Other over the counter solutions such as prune juice, miralax, fiber products, senna, and dulcolax can also be used. If you are taking the pericolace but still have not had a bowel movement in 3 days, start over-the-counter Milk of Magnesia taken twice daily until you have a good result. Call the office with any concerns.     3) HORMONES:   - Please continue to HOLD (not take) your hormone therapy right now since taking it can put you at risk for blood clots in your legs and lungs.   - OK to restart your oral hormone therapy TWO WEEKS AFTER YOUR SURGICAL DATE     4) BLOOD THINNERS:   - Ok to resume your home low dose aspirin (if you take home aspirin)     Incisions:   - Daily showering is important, and you may get incisions wet starting 48 hours after surgery.   - Do not scrub incisions or soak in a bath, pool, hot tub, etc. until incisions have fully healed, tubes have been removed, and authorized by your surgeon, typically 4 weeks.   - The wound dressing should be removed 48 hours after surgery. - The purple skin glue on your incisions will flake off with time and should not be scrubbed. Also avoid applying lotions or ointments to these areas. - If steri-strips were used you may wash over them normally, as you would wash your remaining skin. Steri-strips should fall off on their own within 5-10 days.   - It is preferable for the incision to be left uncovered, but you may cover with gauze if needed for comfort or to protect clothing from drainage.     Tubes / drains: - Have all been removed!     Follow-Up:   - Schedule an appointment to be seen by your primary care provider within 7-10 days of discharge for a postoperative checkup.   - Follow up with Dr. Espinosa as scheduled   - Call or return sooner than your regularly scheduled visit if you develop  "any of the following: Fever (greater than 101.3F), uncontrolled pain, uncontrolled nausea or vomiting, concerns about bowel function, as well as increased redness, swelling, drainage from your wound or any concerns about urinating or urinary catheter drainage.     Phone numbers:   - Monday through Friday 8am to 4:30pm: Call [phone] with questions, requests for medication refills, or to schedule or confirm an appointment.   - Nights, weekends, or holidays: call the after hours emergency pager - [phone] and tell the  \"I would like to page the Urology Resident on call.\" Typically, the on-call provider should return your call within 30 minutes. Please page the on-call provider again if you haven't been contacted as expected. Rarely, the on-call provider will be unable to promptly return a call due to a hospital emergency. If you have paged twice and are still not contacted, ask the hospital  to page the \"urology CHIEF-RESIDENT on call\".   - Please note that due to prescribing laws, resident physicians are unable to prescribe narcotics after-hours. If you feel as though you will need a refill of a narcotic pain medication, you will need to call the clinic during business hours OR seek emergency care. - For emergencies, call 911          Discharge Disposition:     Discharged to home      Attestation: I have reviewed today's vital signs, notes, medications, labs and imaging.    Charlette Camarillo MD  Urology Resident PGY-2  October 5, 2024        "

## 2024-10-07 ENCOUNTER — TELEPHONE (OUTPATIENT)
Dept: PLASTIC SURGERY | Facility: CLINIC | Age: 34
End: 2024-10-07
Payer: COMMERCIAL

## 2024-10-07 NOTE — TELEPHONE ENCOUNTER
Pt had full depth vaginoplasty on 9/30/24 with Dr. Espinosa. Pt discharged from the hospital over the weekend . Today is POD # 7. Called to check on patient postoperatively.    Pain: Creeping up a little, over the past few days. Overall 3-4/10. She is taking Tylenol and ibuprofen. Discussed max doses of each medication to more adequately manage pain.    Urination: Messy, as expected. No issues.    Bowel movements: Yes, regularly. Explained that pt may stop taking stool softeners.     Appetite: Fairly good. It took a few days after surgery to get her pre-surgery appetite back.    Ambulation: Not bad. She is able to navigate her living space. Bending down to  items is challenging. Advised pt to ask her wife for help if she needs. She tires easily, which she understands is expected.    Dilation: Going okay. She notices some stinging during dilation, but is has been manageable. She dilates 3 times per day, 20 minutes per session, spaced throughout the day, and with the purple dilator (all white dots inside her canal). She watches 20 minute videos or plays video games to pass the time.    Questions/concerns: Answered pt questions. She is doing great!    Patient understands how to contact the team if concerns arise during the daytime, evening, weekends, and holidays.      Morgan Sullivan RN

## 2024-10-15 ENCOUNTER — OFFICE VISIT (OUTPATIENT)
Dept: PLASTIC SURGERY | Facility: CLINIC | Age: 34
End: 2024-10-15
Payer: COMMERCIAL

## 2024-10-15 VITALS
TEMPERATURE: 98.4 F | OXYGEN SATURATION: 98 % | SYSTOLIC BLOOD PRESSURE: 123 MMHG | WEIGHT: 293 LBS | BODY MASS INDEX: 45.99 KG/M2 | HEART RATE: 90 BPM | DIASTOLIC BLOOD PRESSURE: 81 MMHG | HEIGHT: 67 IN

## 2024-10-15 DIAGNOSIS — Z87.890 STATUS POST GENDER AFFIRMATION SURGERY: Primary | ICD-10-CM

## 2024-10-15 DIAGNOSIS — T81.31XA POSTOPERATIVE WOUND DEHISCENCE, INITIAL ENCOUNTER: ICD-10-CM

## 2024-10-15 PROCEDURE — 99024 POSTOP FOLLOW-UP VISIT: CPT | Performed by: UROLOGY

## 2024-10-15 ASSESSMENT — PAIN SCALES - GENERAL: PAINLEVEL: MILD PAIN (3)

## 2024-10-15 NOTE — LETTER
"10/15/2024       RE: Ria Leija  [address]  [facility] [address]     Dear Colleague,    Thank you for referring your patient, Ria Leija, to the [facility] [place] at [facility]. Please see a copy of my visit note below.    SUBJECTIVE:  Ria is a 34 year old who underwent full depth vaginoplasty on 09/30/2024. She is doing well overall. Reports pain has been \"mostly manageable\" with tylenol and ibuprofen. She occasionally needs to take an edible to help stay on top of pain.    She is dilating 3 x daily with the purple dilator for 20 min each dilation. She gets all dots inside. Sometimes has increased soreness after dilation, but not too much. She did walk around grocery store yesterday which left her pretty sore, but that improved after a few hours. Normal amount of discharge that ranges from yellowish to brownish, but stays watery.     Urination is messy, but she is hopeful this will improve as swelling improves. She is changing her pad 2-3 x daily.     OBJECTIVE:  /81 (BP Location: Left arm, Patient Position: Standing, Cuff Size: Adult Large)   Pulse 90   Temp 98.4  F (36.9  C) (Oral)   Ht 1.702 m (5' 7\")   Wt 146.1 kg (322 lb 1.6 oz)   SpO2 98%   BMI 50.45 kg/m     General: NAD  Vulvar incisions c/d/I from mons down to lower vulva  Moderate wound dehiscence at posterior aspect of vulva on inside of left labia and midline posterior introitus  Small amount of granulation tissue on posterior left introitus. - silver nitrate applied    ASSESSMENT/PLAN:  S/P full depth vaginoplasty   Post-op wound dehiscence - discussed that skin breakdown can occur during healing from tension and excess moisture. Reassurance provided that this area will heal over time and fill in with fresh tissue. Reviewed recommendation to keep area clean and dry (using a fan can help), tucking gauze between labia, " changing pad more frequently.  Medi-honey applied and discussed applying one to two times daily to encourage wound healing  Continue dilating 3 x daily and reviewed how to start increasing dilator size when ready  RTC as scheduled in 4 weeks with PHILIP Royal APRN, CNP    Physician Attestation  I, Joe Espinosa MD, saw this patient and agree with the findings and plan of care as documented in the note.      Joe Espinosa MD  Reconstructive Urology          Again, thank you for allowing me to participate in the care of your patient.      Sincerely,    Joe Espinosa MD

## 2024-10-15 NOTE — PROGRESS NOTES
"SUBJECTIVE:  Ria is a 34 year old who underwent full depth vaginoplasty on 09/30/2024. She is doing well overall. Reports pain has been \"mostly manageable\" with tylenol and ibuprofen. She occasionally needs to take an edible to help stay on top of pain.    She is dilating 3 x daily with the purple dilator for 20 min each dilation. She gets all dots inside. Sometimes has increased soreness after dilation, but not too much. She did walk around grocery store yesterday which left her pretty sore, but that improved after a few hours. Normal amount of discharge that ranges from yellowish to brownish, but stays watery.     Urination is messy, but she is hopeful this will improve as swelling improves. She is changing her pad 2-3 x daily.     OBJECTIVE:  /81 (BP Location: Left arm, Patient Position: Standing, Cuff Size: Adult Large)   Pulse 90   Temp 98.4  F (36.9  C) (Oral)   Ht 1.702 m (5' 7\")   Wt 146.1 kg (322 lb 1.6 oz)   SpO2 98%   BMI 50.45 kg/m     General: NAD  Vulvar incisions c/d/I from mons down to lower vulva  Moderate wound dehiscence at posterior aspect of vulva on inside of left labia and midline posterior introitus  Small amount of granulation tissue on posterior left introitus. - silver nitrate applied    ASSESSMENT/PLAN:  S/P full depth vaginoplasty   Post-op wound dehiscence - discussed that skin breakdown can occur during healing from tension and excess moisture. Reassurance provided that this area will heal over time and fill in with fresh tissue. Reviewed recommendation to keep area clean and dry (using a fan can help), tucking gauze between labia, changing pad more frequently.  Medi-honey applied and discussed applying one to two times daily to encourage wound healing  Continue dilating 3 x daily and reviewed how to start increasing dilator size when ready  RTC as scheduled in 4 weeks with PHILIP Royal APRN, CNP    Physician Attestation   I, Joe Espinosa MD, saw " this patient and agree with the findings and plan of care as documented in the note.      Joe Espinosa MD  Reconstructive Urology

## 2024-10-15 NOTE — NURSING NOTE
"Chief Complaint   Patient presents with    Surgical Followup     2 week post-op, DOS 9/30/24.       Vitals:    10/15/24 1417   BP: 123/81   BP Location: Left arm   Patient Position: Standing   Cuff Size: Adult Large   Pulse: 90   Temp: 98.4  F (36.9  C)   TempSrc: Oral   SpO2: 98%   Weight: 322 lb 1.6 oz   Height: 5' 7\"       Body mass index is 50.45 kg/m .    Patient reports mild genital pain (3/10).    Mehul Ferguson, EMT    "

## 2024-11-11 ENCOUNTER — OFFICE VISIT (OUTPATIENT)
Dept: PLASTIC SURGERY | Facility: CLINIC | Age: 34
End: 2024-11-11
Payer: COMMERCIAL

## 2024-11-11 VITALS
HEART RATE: 80 BPM | OXYGEN SATURATION: 97 % | HEIGHT: 67 IN | SYSTOLIC BLOOD PRESSURE: 133 MMHG | BODY MASS INDEX: 50.45 KG/M2 | DIASTOLIC BLOOD PRESSURE: 84 MMHG

## 2024-11-11 DIAGNOSIS — T81.31XD POSTOPERATIVE WOUND DEHISCENCE, SUBSEQUENT ENCOUNTER: ICD-10-CM

## 2024-11-11 DIAGNOSIS — Z87.890 STATUS POST GENDER AFFIRMATION SURGERY: Primary | ICD-10-CM

## 2024-11-11 ASSESSMENT — PAIN SCALES - GENERAL: PAINLEVEL_OUTOF10: MILD PAIN (2)

## 2024-11-11 NOTE — LETTER
"11/11/2024       RE: Ria Leija  [address]  [facility] [address]     Dear Colleague,    Thank you for referring your patient, Ria Leija, to the [facility] PLAST[facility] AND RECONSTRU[facility]IVE SURGERY CLINIC [place] at [facility]. Please see a copy of my visit note below.    SUBJECTIVE:  Ria is a 34 year old who underwent full depth vaginoplasty with Dr Espinosa on 09/30/2024. A localized post-op wound dehiscence at posterior vulva was identified at her 2 week post-op visit, and Ria was started on daily Medi-honey and gauze dressings for wound care. She is here today for her 6 week PO visit.   She reports using the Medi-honey and gauze for about 1-2 weeks after her previous appointment, but the wound seems to have filled in well.   She is dilating 3 x daily and has worked up top using blue dilator only and gets all dots inside. She recently stopped using purple as warm-up and is doing blue only. She plans to stick with blue for a while and then start working up to green dilator.  She has explored her clitoris and reports good sensation. She has had successful orgasm and reports good lubrication with arousal and orgasm.  She has noticed an overall decrease in vaginal drainage and discharge, but has expected ongoing drainage. Urination is improving. She is very happy with her outcome so far and feels like things look good for 6 week post-op.   She states \"this has far surpassed what I ever expected.\"       OBJECTIVE:  /84 (BP Location: Left arm, Patient Position: Sitting, Cuff Size: Adult Large)   Pulse 80   Ht 1.702 m (5' 7\")   SpO2 97%   BMI 50.45 kg/m     General: NAD  Vulvar incisions c/d/I  Clitoris healthy and well-hooded  Urethral plate and urethra healing well  Wound dehiscence filled in with fresh, beefy granulation tissue. Approximately 4 cm area of granulation tissue on inside of left labia and at posterior " introitus.      ASSESSMENT/PLAN:  S/P full depth vaginoplasty  Wound dehiscence - wound bed filled with granulation tissue  Silver nitrate of area of granulation tissue completed today in clinic  Discussed recommendation to apply Medi-Honey for 1-2 weeks to this area and RTC in one month for assessment of granulation tissue and possible repeat treatment.  Continue to dilate 3 x daily.         DAVID Ryedr, CNP    A total of 25 minutes was spent today with patient, reviewing records, completing charting, and other tasks as detailed above.       Again, thank you for allowing me to participate in the care of your patient.      Sincerely,    DAVID Royal CNP

## 2024-11-11 NOTE — NURSING NOTE
"Chief Complaint   Patient presents with    Surgical Followup     6 week post-op, DOS 9/30/24.       Vitals:    11/11/24 1228   BP: 133/84   BP Location: Left arm   Patient Position: Sitting   Cuff Size: Adult Large   Pulse: 80   SpO2: 97%   Height: 5' 7\"       Body mass index is 50.45 kg/m .      Mehul Ferguson, EMT    "

## 2024-11-11 NOTE — PROGRESS NOTES
"SUBJECTIVE:  Ria is a 34 year old who underwent full depth vaginoplasty with Dr Espinosa on 09/30/2024. A localized post-op wound dehiscence at posterior vulva was identified at her 2 week post-op visit, and Ria was started on daily Medi-honey and gauze dressings for wound care. She is here today for her 6 week PO visit.   She reports using the Medi-honey and gauze for about 1-2 weeks after her previous appointment, but the wound seems to have filled in well.   She is dilating 3 x daily and has worked up top using blue dilator only and gets all dots inside. She recently stopped using purple as warm-up and is doing blue only. She plans to stick with blue for a while and then start working up to green dilator.  She has explored her clitoris and reports good sensation. She has had successful orgasm and reports good lubrication with arousal and orgasm.  She has noticed an overall decrease in vaginal drainage and discharge, but has expected ongoing drainage. Urination is improving. She is very happy with her outcome so far and feels like things look good for 6 week post-op.   She states \"this has far surpassed what I ever expected.\"       OBJECTIVE:  /84 (BP Location: Left arm, Patient Position: Sitting, Cuff Size: Adult Large)   Pulse 80   Ht 1.702 m (5' 7\")   SpO2 97%   BMI 50.45 kg/m     General: NAD  Vulvar incisions c/d/I  Clitoris healthy and well-hooded  Urethral plate and urethra healing well  Wound dehiscence filled in with fresh, beefy granulation tissue. Approximately 4 cm area of granulation tissue on inside of left labia and at posterior introitus.      ASSESSMENT/PLAN:  S/P full depth vaginoplasty  Wound dehiscence - wound bed filled with granulation tissue  Silver nitrate of area of granulation tissue completed today in clinic  Discussed recommendation to apply Medi-Honey for 1-2 weeks to this area and RTC in one month for assessment of granulation tissue and possible repeat treatment.  Continue " to dilate 3 x daily.         DAVID Ryder, CNP    A total of 25 minutes was spent today with patient, reviewing records, completing charting, and other tasks as detailed above.

## 2025-04-08 ENCOUNTER — OFFICE VISIT (OUTPATIENT)
Dept: PLASTIC SURGERY | Facility: CLINIC | Age: 35
End: 2025-04-08
Payer: COMMERCIAL

## 2025-04-08 VITALS
SYSTOLIC BLOOD PRESSURE: 143 MMHG | DIASTOLIC BLOOD PRESSURE: 82 MMHG | BODY MASS INDEX: 49.34 KG/M2 | HEART RATE: 89 BPM | HEIGHT: 67 IN | OXYGEN SATURATION: 95 %

## 2025-04-08 DIAGNOSIS — F64.9 GENDER DYSPHORIA: Primary | ICD-10-CM

## 2025-04-08 PROCEDURE — 99213 OFFICE O/P EST LOW 20 MIN: CPT | Mod: 25 | Performed by: UROLOGY

## 2025-04-08 PROCEDURE — 17250 CHEM CAUT OF GRANLTJ TISSUE: CPT | Performed by: UROLOGY

## 2025-04-08 ASSESSMENT — PAIN SCALES - GENERAL: PAINLEVEL_OUTOF10: NO PAIN (0)

## 2025-04-08 NOTE — NURSING NOTE
"Chief Complaint   Patient presents with    Surgical Followup     6 month post-op, DOS 9/30/24.       Vitals:    04/08/25 1240   BP: (!) 143/82   BP Location: Left arm   Patient Position: Sitting   Cuff Size: Adult Large   Pulse: 89   SpO2: 95%   Height: 5' 7\"       Body mass index is 49.34 kg/m .          Mehul Ferguson, EMT    "

## 2025-04-08 NOTE — LETTER
"4/8/2025       RE: Ria Leija  [address]  [facility] [address]     Dear Colleague,    Thank you for referring your patient, Ria Leija, to the [facility] PLASTIC AND RECONSTRUCTIVE SURGERY CLINIC [place] at [facility]. Please see a copy of my visit note below.    SUBJECTIVE:  Ria is a 34 year old who underwent full depth vaginoplasty with Dr Espinosa on 09/30/2024.  She has full depth.  She has been having granulation tissue cauterization.      OBJECTIVE:  BP (!) 143/82 (BP Location: Left arm, Patient Position: Sitting, Cuff Size: Adult Large)   Pulse 89   Ht 1.702 m (5' 7\")   SpO2 95%   BMI 49.34 kg/m     General: NAD  Vulvar incisions c/d/I  Clitoris healthy and well-hooded  Urethral plate and urethra healing well  I cauterized one focal spot at posterior introitus. The rest of the granulation tissue is essentially resolved.      ASSESSMENT/PLAN:  S/P full depth vaginoplasty  Granulation tissue - area of granulation tissue treated with silver nitrate  Continue to dilate 2 x daily until one year post-op  Followup one year after surgery.    Joe Espinosa MD      Again, thank you for allowing me to participate in the care of your patient.      Sincerely,    Joe Espinosa MD    "

## 2025-04-12 NOTE — PROGRESS NOTES
"SUBJECTIVE:  Ria is a 34 year old who underwent full depth vaginoplasty with Dr Espinosa on 09/30/2024.  She has full depth.  She has been having granulation tissue cauterization.      OBJECTIVE:  BP (!) 143/82 (BP Location: Left arm, Patient Position: Sitting, Cuff Size: Adult Large)   Pulse 89   Ht 1.702 m (5' 7\")   SpO2 95%   BMI 49.34 kg/m     General: NAD  Vulvar incisions c/d/I  Clitoris healthy and well-hooded  Urethral plate and urethra healing well  I cauterized one focal spot at posterior introitus. The rest of the granulation tissue is essentially resolved.      ASSESSMENT/PLAN:  S/P full depth vaginoplasty  Granulation tissue - area of granulation tissue treated with silver nitrate  Continue to dilate 2 x daily until one year post-op  Followup one year after surgery.    Joe Espinosa MD  "

## 2025-07-20 ENCOUNTER — HEALTH MAINTENANCE LETTER (OUTPATIENT)
Age: 35
End: 2025-07-20

## (undated) DEVICE — SYR BULB IRRIG DOVER 60 ML LATEX FREE 67000

## (undated) DEVICE — SU VICRYL 3-0 SH 27" J316H

## (undated) DEVICE — DAVINCI XI DRAPE COLUMN 470341

## (undated) DEVICE — SYR PISTON IRRIGATION 60 ML DYND20325

## (undated) DEVICE — DRAPE UNDER BUTTOCK 8483

## (undated) DEVICE — SU PROLENE 0 CT-1 30" 8424H

## (undated) DEVICE — Device

## (undated) DEVICE — LABEL MEDICATION SYSTEM 3303-P

## (undated) DEVICE — TUBING FILTER TRI-LUMEN AIRSEAL ASC-EVAC1

## (undated) DEVICE — BLADE KNIFE SURG 15 371115

## (undated) DEVICE — GOWN XLG DISP 9545

## (undated) DEVICE — SU ETHILON 3-0 FS-1 18" 663G

## (undated) DEVICE — DRSG XEROFORM 5X9" 8884431605

## (undated) DEVICE — SU MONOCRYL 3-0 SH 27" UND Y416H

## (undated) DEVICE — SU VICRYL+ 3-0 27IN SH UND VCP416H

## (undated) DEVICE — DRAPE U SPLIT 74X120" 29440

## (undated) DEVICE — LINEN TOWEL PACK X30 5481

## (undated) DEVICE — CLEANER INST PRE-KLENZ SOAK SHIELD TUBE 6 ML MEDIUM 2D66J4

## (undated) DEVICE — SURGICEL POWDER ABSORBABLE HEMOSTAT 3GM 3013SP

## (undated) DEVICE — JELLY LUBRICATING SURGILUBE 2OZ TUBE 0281-0205-02

## (undated) DEVICE — DRAPE POUCH INSTRUMENT 1018

## (undated) DEVICE — GLOVE BIOGEL PI MICRO SZ 7.5 48575

## (undated) DEVICE — SU MONOCRYL 4-0 PS-2 18" UND Y496G

## (undated) DEVICE — DRAPE POUCH IRR 1016

## (undated) DEVICE — ANTIFOG SOLUTION SEE SHARP 150M TROCAR SWABS 30978 (COI)

## (undated) DEVICE — SU SILK 0 SH 30" K834H

## (undated) DEVICE — SU PDS II 3-0 SH 27" CLR Z416H

## (undated) DEVICE — PREP POVIDONE-IODINE 10% SOLUTION 4OZ BOTTLE MDS093944

## (undated) DEVICE — ATTENTION CASE CART PLEASE PICK

## (undated) DEVICE — SU PDS II 6-0 RB-2DA 30" Z149H

## (undated) DEVICE — DAVINCI XI DRAPE ARM 470015

## (undated) DEVICE — SUCTION IRR STRYKERFLOW II W/TIP 250-070-520

## (undated) DEVICE — SU DERMABOND ADVANCED .7ML DNX12

## (undated) DEVICE — KIT ENDO FIRST STEP DISINFECTANT 200ML W/POUCH EP-4

## (undated) DEVICE — DRAIN JACKSON PRATT RESERVOIR 100ML SU130-1305

## (undated) DEVICE — GOWN IMPERVIOUS SPECIALTY XLG/XLONG 32474

## (undated) DEVICE — DRAPE MAYO STAND 23X54 8337

## (undated) DEVICE — COVER CAMERA IN-LIGHT DISP LT-C02

## (undated) DEVICE — SPONGE LAP 18X18" X8435

## (undated) DEVICE — SOL WATER IRRIG 1000ML BOTTLE 2F7114

## (undated) DEVICE — SUCTION MANIFOLD NEPTUNE 2 SYS 4 PORT 0702-020-000

## (undated) DEVICE — DRSG KERLIX 4 1/2"X4YDS ROLL 6730

## (undated) DEVICE — DAVINCI XI SEAL UNIVERSAL 5-12MM 470500

## (undated) DEVICE — DAVINCI HOT SHEARS TIP COVER  400180

## (undated) DEVICE — SU PDS II 3-0 SH 27" Z316H

## (undated) DEVICE — SYR 50ML LL W/O NDL 309653

## (undated) DEVICE — ESU LIGASURE DISSECTOR EXACT LF2019

## (undated) DEVICE — ENDO TROCAR CONMED AIRSEAL BLADELESS 08X120MM IAS8-120LP

## (undated) DEVICE — DRAIN JACKSON PRATT 10FR ROUND SU130-1321

## (undated) DEVICE — ESU CORD BIPOLAR GREEN 10-4000

## (undated) DEVICE — PREP CHLORAPREP 26ML TINTED HI-LITE ORANGE 930815

## (undated) DEVICE — KIT POSITIONER PINK PAD XL ADVANCED 40588

## (undated) DEVICE — TUBING SUCTION MEDI-VAC 1/4"X20' N620A

## (undated) DEVICE — NDL INSUFFLATION 13GA 120MM C2201

## (undated) DEVICE — DRAPE IOBAN INCISE 23X17" 6650EZ

## (undated) DEVICE — SUCTION TIP YANKAUER W/O VENT K86

## (undated) DEVICE — CATH TRAY FOLEY SURESTEP 16FR WDRAIN BAG STLK LATEX A300316A

## (undated) RX ORDER — PROPOFOL 10 MG/ML
INJECTION, EMULSION INTRAVENOUS
Status: DISPENSED
Start: 2024-09-30

## (undated) RX ORDER — HYDROMORPHONE HYDROCHLORIDE 1 MG/ML
INJECTION, SOLUTION INTRAMUSCULAR; INTRAVENOUS; SUBCUTANEOUS
Status: DISPENSED
Start: 2024-09-30

## (undated) RX ORDER — DEXAMETHASONE SODIUM PHOSPHATE 4 MG/ML
INJECTION, SOLUTION INTRA-ARTICULAR; INTRALESIONAL; INTRAMUSCULAR; INTRAVENOUS; SOFT TISSUE
Status: DISPENSED
Start: 2024-09-30

## (undated) RX ORDER — CEFAZOLIN SODIUM 1 G/3ML
INJECTION, POWDER, FOR SOLUTION INTRAMUSCULAR; INTRAVENOUS
Status: DISPENSED
Start: 2024-09-30

## (undated) RX ORDER — FENTANYL CITRATE 50 UG/ML
INJECTION, SOLUTION INTRAMUSCULAR; INTRAVENOUS
Status: DISPENSED
Start: 2024-09-30

## (undated) RX ORDER — CEFAZOLIN SODIUM/WATER 3 G/30 ML
SYRINGE (ML) INTRAVENOUS
Status: DISPENSED
Start: 2024-09-30

## (undated) RX ORDER — ONDANSETRON 2 MG/ML
INJECTION INTRAMUSCULAR; INTRAVENOUS
Status: DISPENSED
Start: 2024-09-30

## (undated) RX ORDER — METRONIDAZOLE 500 MG/100ML
INJECTION, SOLUTION INTRAVENOUS
Status: DISPENSED
Start: 2024-09-30

## (undated) RX ORDER — EPINEPHRINE 1 MG/ML
INJECTION, SOLUTION INTRAMUSCULAR; SUBCUTANEOUS
Status: DISPENSED
Start: 2024-09-30

## (undated) RX ORDER — ACETAMINOPHEN 325 MG/1
TABLET ORAL
Status: DISPENSED
Start: 2024-09-30